# Patient Record
Sex: FEMALE | Race: WHITE | Employment: FULL TIME | URBAN - NONMETROPOLITAN AREA
[De-identification: names, ages, dates, MRNs, and addresses within clinical notes are randomized per-mention and may not be internally consistent; named-entity substitution may affect disease eponyms.]

---

## 2017-03-22 ENCOUNTER — TELEPHONE (OUTPATIENT)
Dept: OTHER | Age: 37
End: 2017-03-22

## 2017-04-20 LAB
CHOLESTEROL, TOTAL: ABNORMAL MG/DL
CHOLESTEROL/HDL RATIO: ABNORMAL
CREATININE, URINE: 220.8
HDLC SERPL-MCNC: ABNORMAL MG/DL (ref 35–70)
LDL CHOLESTEROL CALCULATED: 161 MG/DL (ref 0–160)
MICROALBUMIN/CREAT 24H UR: 77.2 MG/G{CREAT}
MICROALBUMIN/CREAT UR-RTO: 35
T4 FREE: 0.82
TRIGL SERPL-MCNC: ABNORMAL MG/DL
TSH SERPL DL<=0.05 MIU/L-ACNC: 2.62 UIU/ML
VLDLC SERPL CALC-MCNC: ABNORMAL MG/DL

## 2017-04-25 DIAGNOSIS — E05.90 HYPERTHYROIDISM: ICD-10-CM

## 2017-04-25 RX ORDER — METHIMAZOLE 5 MG/1
TABLET ORAL
Qty: 30 TABLET | Refills: 3 | Status: SHIPPED | OUTPATIENT
Start: 2017-04-25 | End: 2017-04-27 | Stop reason: SDUPTHER

## 2017-04-27 ENCOUNTER — OFFICE VISIT (OUTPATIENT)
Dept: ENDOCRINOLOGY | Age: 37
End: 2017-04-27

## 2017-04-27 VITALS
DIASTOLIC BLOOD PRESSURE: 65 MMHG | HEIGHT: 70 IN | SYSTOLIC BLOOD PRESSURE: 118 MMHG | RESPIRATION RATE: 18 BRPM | BODY MASS INDEX: 32.43 KG/M2 | WEIGHT: 226.5 LBS | HEART RATE: 62 BPM

## 2017-04-27 DIAGNOSIS — E10.9 TYPE 1 DIABETES MELLITUS WITHOUT COMPLICATION (HCC): Primary | ICD-10-CM

## 2017-04-27 DIAGNOSIS — R80.9 MICROALBUMINURIA: ICD-10-CM

## 2017-04-27 DIAGNOSIS — E05.90 HYPERTHYROIDISM: ICD-10-CM

## 2017-04-27 DIAGNOSIS — E05.00 GRAVES DISEASE: ICD-10-CM

## 2017-04-27 DIAGNOSIS — E78.5 HYPERLIPIDEMIA, UNSPECIFIED HYPERLIPIDEMIA TYPE: ICD-10-CM

## 2017-04-27 PROCEDURE — 99215 OFFICE O/P EST HI 40 MIN: CPT | Performed by: INTERNAL MEDICINE

## 2017-04-27 RX ORDER — METHIMAZOLE 5 MG/1
TABLET ORAL
Qty: 30 TABLET | Refills: 5 | Status: SHIPPED | OUTPATIENT
Start: 2017-04-27 | End: 2018-04-23 | Stop reason: SDUPTHER

## 2017-09-05 ENCOUNTER — TELEPHONE (OUTPATIENT)
Dept: PHARMACY | Age: 37
End: 2017-09-05

## 2017-09-25 ENCOUNTER — OFFICE VISIT (OUTPATIENT)
Dept: ENDOCRINOLOGY | Age: 37
End: 2017-09-25
Payer: COMMERCIAL

## 2017-09-25 VITALS
HEART RATE: 62 BPM | HEIGHT: 70 IN | BODY MASS INDEX: 33.43 KG/M2 | RESPIRATION RATE: 20 BRPM | SYSTOLIC BLOOD PRESSURE: 114 MMHG | WEIGHT: 233.5 LBS | DIASTOLIC BLOOD PRESSURE: 53 MMHG

## 2017-09-25 DIAGNOSIS — E10.9 TYPE 1 DIABETES MELLITUS WITHOUT COMPLICATION (HCC): Primary | ICD-10-CM

## 2017-09-25 DIAGNOSIS — R80.9 MICROALBUMINURIA: ICD-10-CM

## 2017-09-25 DIAGNOSIS — E05.00 GRAVES DISEASE: ICD-10-CM

## 2017-09-25 DIAGNOSIS — E78.00 PURE HYPERCHOLESTEROLEMIA: ICD-10-CM

## 2017-09-25 PROCEDURE — 99215 OFFICE O/P EST HI 40 MIN: CPT | Performed by: INTERNAL MEDICINE

## 2017-09-25 RX ORDER — NIACIN 500 MG
TABLET ORAL
Qty: 90 TABLET | Refills: 3 | Status: SHIPPED | OUTPATIENT
Start: 2017-09-25 | End: 2019-02-19

## 2017-09-25 RX ORDER — LISINOPRIL 2.5 MG/1
TABLET ORAL
Qty: 30 TABLET | Refills: 3 | Status: SHIPPED | OUTPATIENT
Start: 2017-09-25 | End: 2018-06-19 | Stop reason: SDUPTHER

## 2017-09-25 ASSESSMENT — ENCOUNTER SYMPTOMS: BLURRED VISION: 0

## 2018-02-20 ENCOUNTER — OFFICE VISIT (OUTPATIENT)
Dept: ENDOCRINOLOGY | Age: 38
End: 2018-02-20
Payer: COMMERCIAL

## 2018-02-20 VITALS
WEIGHT: 234 LBS | HEART RATE: 69 BPM | BODY MASS INDEX: 33.5 KG/M2 | SYSTOLIC BLOOD PRESSURE: 104 MMHG | HEIGHT: 70 IN | DIASTOLIC BLOOD PRESSURE: 59 MMHG | RESPIRATION RATE: 14 BRPM

## 2018-02-20 DIAGNOSIS — E78.00 PURE HYPERCHOLESTEROLEMIA: ICD-10-CM

## 2018-02-20 DIAGNOSIS — E05.00 GRAVES DISEASE: ICD-10-CM

## 2018-02-20 DIAGNOSIS — R80.9 URINE TEST POSITIVE FOR MICROALBUMINURIA: ICD-10-CM

## 2018-02-20 DIAGNOSIS — I10 BENIGN ESSENTIAL HYPERTENSION: ICD-10-CM

## 2018-02-20 DIAGNOSIS — E10.9 TYPE 1 DIABETES MELLITUS WITHOUT COMPLICATION (HCC): Primary | ICD-10-CM

## 2018-02-20 PROCEDURE — 99215 OFFICE O/P EST HI 40 MIN: CPT | Performed by: INTERNAL MEDICINE

## 2018-02-20 ASSESSMENT — ENCOUNTER SYMPTOMS
WHEEZING: 0
PHOTOPHOBIA: 0
BACK PAIN: 0
COUGH: 0
SORE THROAT: 0
VOMITING: 0
DOUBLE VISION: 0
EYE PAIN: 0
SHORTNESS OF BREATH: 0
ABDOMINAL PAIN: 0
BLURRED VISION: 0
NAUSEA: 0

## 2018-02-20 NOTE — PROGRESS NOTES
urgency. Musculoskeletal: Negative for back pain, joint pain and myalgias. Skin: Negative for itching and rash. Neurological: Negative for dizziness, tingling, tremors, weakness and headaches. Endo/Heme/Allergies: Negative for polydipsia. Psychiatric/Behavioral: Negative for depression. The patient is not nervous/anxious. Objective:   BP (!) 104/59 (Site: Right Arm, Position: Sitting, Cuff Size: Large Adult)   Pulse 69   Resp 14   Ht 5' 10.08\" (1.78 m)   Wt 234 lb (106.1 kg)   BMI 33.50 kg/m²     General:  alert, appears stated age, cooperative and no distress   Oropharynx: normal findings: lips normal without lesions    Eyes:  negative findings: lids and lashes normal, conjunctivae and sclerae normal, corneas clear        Neck: no adenopathy, no carotid bruit, supple, symmetrical, trachea midline and thyroid not enlarged, symmetric, no tenderness/mass/nodules   Thyroid:  no palpable nodule   Lung: clear to auscultation bilaterally   Heart:  regular rate and rhythm, S1, S2 normal, no murmur, click, rub or gallop    Abdomen: soft, non-tender; bowel sounds normal; no masses,  no organomegaly   Extremities: extremities normal, atraumatic, no cyanosis or edema   Pulses: 2+ and symmetric   Skin: warm and dry, no hyperpigmentation, vitiligo, or suspicious lesions   Neuro: normal without focal findings, mental status, speech normal, alert and oriented x3       Patient was asked to remove their socks and shoes and a full foot exam was performed. The following was found during the patient's foot exam:  [x]   Normal Exam  Monofilament sensation   [x]   Normal      []   Abnormal   Pulses   [x]   Normal      []   Abnormal          Assessment/Plan:     1. Type 1 diabetes mellitus without complication ([facility] [address]): Overdue for an HbA1c. This was ordered today. Goal HbA1c <7%. Goal fasting and pre-meal BG  mg/dl. Trend noted for hyperglycemia after lunch and dinner.  Will increase ICR at these meals.    PUMP CHANGE  7 AM ICR 1:6.5 g --> 1:6 g       2. Graves disease: Continue methimazole. She is tolerating. We discussed the side effects of MMI which include agranulocytosis (fever, sore throat), elevated LFTs, rash and joint pains. She will call if she has any symptoms. We also discussed the options for definitive management such as surgery or LIRIANO. 3. Pure hypercholesterolemia: Continue niacin  Does not want to start statin. Continue to monitor lipids. 4. Microalbuminuria : due to diabetes mellitus. Continue ACE inhibitor. She was advised to get on birth control while on ACE inhibitor. 5. BP At goal.        Orders Placed This Encounter   Procedures    TSH     Standing Status:   Future     Standing Expiration Date:   2/20/2019    T4, Free     Standing Status:   Future     Standing Expiration Date:   2/20/2019    T3, Free     Standing Status:   Future     Standing Expiration Date:   2/20/2019    Hemoglobin A1C     Standing Status:   Future     Standing Expiration Date:   2/20/2019    Comprehensive Metabolic Panel, Fasting     Standing Status:   Future     Standing Expiration Date:   2/20/2019    Lipid Panel     Standing Status:   Future     Standing Expiration Date:   2/20/2019     Order Specific Question:   Is Patient Fasting?/# of Hours     Answer:   yes/8    Microalbumin / Creatinine Urine Ratio     Standing Status:   Future     Standing Expiration Date:   2/20/2019     DIABETES FOOT EXAM          I spent 50 minutes with this pt, more than 50% of this time was spent reviewing labs and blood sugar logs and counseling and answering patient questions. Return in about 3 months (around 5/20/2018).

## 2018-03-02 ENCOUNTER — TELEPHONE (OUTPATIENT)
Dept: ENDOCRINOLOGY | Age: 38
End: 2018-03-02

## 2018-03-02 NOTE — TELEPHONE ENCOUNTER
Can you let her know the hemoglobin A1c was 7.6%. She still has protein in her urine. Her cholesterol is still high. Would she consider going on a cholesterol lowering drug called a statin?     Thanks

## 2018-04-23 DIAGNOSIS — E05.90 HYPERTHYROIDISM: ICD-10-CM

## 2018-04-23 RX ORDER — METHIMAZOLE 5 MG/1
TABLET ORAL
Qty: 30 TABLET | Refills: 5 | Status: SHIPPED | OUTPATIENT
Start: 2018-04-23 | End: 2018-06-19 | Stop reason: SDUPTHER

## 2018-06-19 DIAGNOSIS — E05.90 HYPERTHYROIDISM: ICD-10-CM

## 2018-06-19 DIAGNOSIS — R80.9 MICROALBUMINURIA: ICD-10-CM

## 2018-06-20 RX ORDER — METHIMAZOLE 5 MG/1
TABLET ORAL
Qty: 30 TABLET | Refills: 3 | Status: SHIPPED | OUTPATIENT
Start: 2018-06-20 | End: 2019-02-19

## 2018-06-20 RX ORDER — LISINOPRIL 2.5 MG/1
TABLET ORAL
Qty: 15 TABLET | Refills: 3 | Status: SHIPPED | OUTPATIENT
Start: 2018-06-20 | End: 2019-02-19 | Stop reason: SDUPTHER

## 2018-10-01 LAB
AVERAGE GLUCOSE: NORMAL
HBA1C MFR BLD: 8.2 %

## 2018-11-01 ENCOUNTER — OFFICE VISIT (OUTPATIENT)
Dept: INTERNAL MEDICINE CLINIC | Age: 38
End: 2018-11-01
Payer: COMMERCIAL

## 2018-11-01 VITALS — BODY MASS INDEX: 33.09 KG/M2 | WEIGHT: 231.13 LBS

## 2018-11-01 DIAGNOSIS — E10.9 TYPE 1 DIABETES MELLITUS WITHOUT COMPLICATION (HCC): ICD-10-CM

## 2018-11-01 PROCEDURE — G0108 DIAB MANAGE TRN  PER INDIV: HCPCS | Performed by: INTERNAL MEDICINE

## 2018-12-19 ENCOUNTER — TELEPHONE (OUTPATIENT)
Dept: INTERNAL MEDICINE CLINIC | Age: 38
End: 2018-12-19

## 2019-02-14 ENCOUNTER — OFFICE VISIT (OUTPATIENT)
Dept: INTERNAL MEDICINE CLINIC | Age: 39
End: 2019-02-14
Payer: COMMERCIAL

## 2019-02-14 ENCOUNTER — HOSPITAL ENCOUNTER (OUTPATIENT)
Age: 39
Discharge: HOME | End: 2019-02-14
Payer: COMMERCIAL

## 2019-02-14 DIAGNOSIS — E10.9 TYPE 1 DIABETES MELLITUS WITHOUT COMPLICATION (HCC): Primary | ICD-10-CM

## 2019-02-14 LAB
ALBUMIN SERPL-MCNC: 4.5 G/DL (ref 3.5–5.1)
ALP BLD-CCNC: 56 U/L (ref 38–126)
ALT SERPL-CCNC: 14 U/L (ref 11–66)
ANION GAP SERPL CALCULATED.3IONS-SCNC: 12 MEQ/L (ref 8–16)
AST SERPL-CCNC: 13 U/L (ref 5–40)
BILIRUB SERPL-MCNC: 0.9 MG/DL (ref 0.3–1.2)
BUN BLDV-MCNC: 14 MG/DL (ref 7–22)
CALCIUM SERPL-MCNC: 9.2 MG/DL (ref 8.5–10.5)
CHLORIDE BLD-SCNC: 102 MEQ/L (ref 98–111)
CHOLESTEROL, TOTAL: 233 MG/DL (ref 100–199)
CO2: 26 MEQ/L (ref 23–33)
CREAT SERPL-MCNC: 0.7 MG/DL (ref 0.4–1.2)
CREATININE, URINE: 122.1 MG/DL
GFR SERPL CREATININE-BSD FRML MDRD: > 90 ML/MIN/1.73M2
GLUCOSE BLD-MCNC: 125 MG/DL (ref 70–108)
HBA1C MFR BLD: 6.8 % (ref 4.3–5.7)
HDLC SERPL-MCNC: 47 MG/DL
LDL CHOLESTEROL CALCULATED: 173 MG/DL
MICROALBUMIN UR-MCNC: 2.63 MG/DL
MICROALBUMIN/CREAT UR-RTO: 22 MG/G (ref 0–30)
POTASSIUM SERPL-SCNC: 4.4 MEQ/L (ref 3.5–5.2)
SODIUM BLD-SCNC: 140 MEQ/L (ref 135–145)
T3 FREE: 2.64 PG/ML (ref 2.02–4.43)
T4 FREE: 1.18 NG/DL (ref 0.93–1.76)
TOTAL PROTEIN: 7.3 G/DL (ref 6.1–8)
TRIGL SERPL-MCNC: 66 MG/DL (ref 0–199)
TSH SERPL DL<=0.05 MIU/L-ACNC: 2.05 UIU/ML (ref 0.4–4.2)

## 2019-02-14 PROCEDURE — 36415 COLL VENOUS BLD VENIPUNCTURE: CPT

## 2019-02-14 PROCEDURE — 80061 LIPID PANEL: CPT

## 2019-02-14 PROCEDURE — 84443 ASSAY THYROID STIM HORMONE: CPT

## 2019-02-14 PROCEDURE — 82043 UR ALBUMIN QUANTITATIVE: CPT

## 2019-02-14 PROCEDURE — 83036 HEMOGLOBIN GLYCOSYLATED A1C: CPT | Performed by: INTERNAL MEDICINE

## 2019-02-14 PROCEDURE — G0108 DIAB MANAGE TRN  PER INDIV: HCPCS | Performed by: INTERNAL MEDICINE

## 2019-02-14 PROCEDURE — 80053 COMPREHEN METABOLIC PANEL: CPT

## 2019-02-14 PROCEDURE — 84439 ASSAY OF FREE THYROXINE: CPT

## 2019-02-14 PROCEDURE — 84481 FREE ASSAY (FT-3): CPT

## 2019-02-19 ENCOUNTER — OFFICE VISIT (OUTPATIENT)
Dept: INTERNAL MEDICINE CLINIC | Age: 39
End: 2019-02-19
Payer: COMMERCIAL

## 2019-02-19 VITALS
BODY MASS INDEX: 33.64 KG/M2 | WEIGHT: 235 LBS | HEART RATE: 68 BPM | HEIGHT: 70 IN | DIASTOLIC BLOOD PRESSURE: 84 MMHG | SYSTOLIC BLOOD PRESSURE: 124 MMHG

## 2019-02-19 DIAGNOSIS — E05.00 GRAVES DISEASE: ICD-10-CM

## 2019-02-19 DIAGNOSIS — E10.9 TYPE 1 DIABETES MELLITUS WITHOUT COMPLICATION (HCC): Primary | ICD-10-CM

## 2019-02-19 DIAGNOSIS — E01.0 THYROMEGALY: ICD-10-CM

## 2019-02-19 PROCEDURE — 99214 OFFICE O/P EST MOD 30 MIN: CPT | Performed by: INTERNAL MEDICINE

## 2019-02-19 RX ORDER — LISINOPRIL 2.5 MG/1
TABLET ORAL
Qty: 15 TABLET | Refills: 5 | Status: SHIPPED | OUTPATIENT
Start: 2019-02-19 | End: 2019-05-14 | Stop reason: SDUPTHER

## 2019-02-19 ASSESSMENT — PATIENT HEALTH QUESTIONNAIRE - PHQ9
2. FEELING DOWN, DEPRESSED OR HOPELESS: 0
SUM OF ALL RESPONSES TO PHQ QUESTIONS 1-9: 0
SUM OF ALL RESPONSES TO PHQ QUESTIONS 1-9: 0
1. LITTLE INTEREST OR PLEASURE IN DOING THINGS: 0
SUM OF ALL RESPONSES TO PHQ9 QUESTIONS 1 & 2: 0

## 2019-02-21 ENCOUNTER — HOSPITAL ENCOUNTER (OUTPATIENT)
Dept: ULTRASOUND IMAGING | Age: 39
Discharge: HOME | End: 2019-02-21
Payer: COMMERCIAL

## 2019-02-21 DIAGNOSIS — E01.0 THYROMEGALY: ICD-10-CM

## 2019-02-21 DIAGNOSIS — E05.00 GRAVES DISEASE: ICD-10-CM

## 2019-02-21 PROCEDURE — 76536 US EXAM OF HEAD AND NECK: CPT

## 2019-02-25 ENCOUNTER — TELEPHONE (OUTPATIENT)
Dept: INTERNAL MEDICINE CLINIC | Age: 39
End: 2019-02-25

## 2019-03-26 ENCOUNTER — TELEPHONE (OUTPATIENT)
Dept: INTERNAL MEDICINE CLINIC | Age: 39
End: 2019-03-26

## 2019-05-14 ENCOUNTER — OFFICE VISIT (OUTPATIENT)
Dept: INTERNAL MEDICINE CLINIC | Age: 39
End: 2019-05-14
Payer: COMMERCIAL

## 2019-05-14 VITALS
HEART RATE: 66 BPM | DIASTOLIC BLOOD PRESSURE: 67 MMHG | BODY MASS INDEX: 34.34 KG/M2 | WEIGHT: 239.31 LBS | SYSTOLIC BLOOD PRESSURE: 123 MMHG

## 2019-05-14 VITALS
DIASTOLIC BLOOD PRESSURE: 74 MMHG | WEIGHT: 240 LBS | HEIGHT: 70 IN | SYSTOLIC BLOOD PRESSURE: 108 MMHG | BODY MASS INDEX: 34.36 KG/M2 | HEART RATE: 66 BPM

## 2019-05-14 DIAGNOSIS — E10.9 TYPE 1 DIABETES MELLITUS WITHOUT COMPLICATION (HCC): Primary | ICD-10-CM

## 2019-05-14 DIAGNOSIS — E05.00 GRAVES DISEASE: ICD-10-CM

## 2019-05-14 DIAGNOSIS — Z12.4 CERVICAL CANCER SCREENING: ICD-10-CM

## 2019-05-14 LAB — HBA1C MFR BLD: 6.6 % (ref 4.3–5.7)

## 2019-05-14 PROCEDURE — 99213 OFFICE O/P EST LOW 20 MIN: CPT | Performed by: INTERNAL MEDICINE

## 2019-05-14 PROCEDURE — 83036 HEMOGLOBIN GLYCOSYLATED A1C: CPT | Performed by: INTERNAL MEDICINE

## 2019-05-14 PROCEDURE — G0108 DIAB MANAGE TRN  PER INDIV: HCPCS | Performed by: INTERNAL MEDICINE

## 2019-05-14 RX ORDER — LISINOPRIL 2.5 MG/1
TABLET ORAL
Qty: 15 TABLET | Refills: 5 | Status: SHIPPED | OUTPATIENT
Start: 2019-05-14 | End: 2019-09-17 | Stop reason: SDUPTHER

## 2019-05-14 NOTE — PROGRESS NOTES
1980    Chief Complaint   Patient presents with    Diabetes    Graves' Disease    Other     thyromegaly       Pt is a 45 y.o. female who presents for endo issues. PCP Dr. Tonia Kumar. Diabetes - Normal kidney function, and normal microalbumin 19 - she is on low dose lisinopril. No current statin - many family member unable to tolerate - will ask mom what she didn't tolerate. ASCVD is 6% but  in DM patient - the ASCVD score will be >7.5 at age 37 if all other variables are constant. Foot exam today, no neuropathy  Autonomic dysfunction - patient denies  Eye exam done - get note from [facility] - no DM retinopathy  On insulin pump, sees Chris Juarez DM educator at DM clinic - HgA1c 6.6 2019 - at goal.    She is actively checking FSBS 4-5x daily. She has a consistent KRISTIN phenomenon that is easily seen on her cgm. Abby to follow data and see if nighttime highs continue. Hyperthyroid - now normal - off Tapazole x one year. Enlarged thyroid on exam.  Check thyroid ultrasound. No nodule seen on normal ultrasound 19. Past Medical History:   Diagnosis Date    Abnormal Pap smear     Diabetes mellitus ([address]     type I    Graves disease     Hyperlipidemia     Infertility, female     klomid       Past Surgical History:   Procedure Laterality Date    ANTERIOR CRUCIATE LIGAMENT REPAIR       SECTION      [facility]      WISDOM TOOTH EXTRACTION         Current Outpatient Medications   Medication Sig Dispense Refill    lisinopril (ZESTRIL) 2.5 MG tablet 1/2 tablet daily 15 tablet 5    insulin lispro (HUMALOG) 100 UNIT/ML injection vial Use for continuous infusion in insulin pump. Max dose of 85 units daily 3 vial 5    Cholecalciferol (VITAMIN D3) 5000 units CAPS Take 1 capsule by mouth daily       No current facility-administered medications for this visit.         Allergies   Allergen Reactions    No Known Drug Allergy        Social History Plan:      Diagnosis Orders   1. Type 1 diabetes mellitus without complication (HCC)  lisinopril (ZESTRIL) 2.5 MG tablet    insulin lispro (HUMALOG) 100 UNIT/ML injection vial   2. Graves disease     3. Cervical cancer screening  Sapphire Manley MD, Obstetrics & Gynecology, [facility]     DM - at goal.  Reminded her to get eye exam yearly. Foot exam done. Abby to monitor downloads of CGM and suggest pump changes. On lisinopril and monitor BP. Graves disease/thyromegaly - checked ultrasound, normal TSH, FT4 and FT3 2/2019- normal.    LDL elevated but ASCVD score <7.5 and patient doesn't want to start statin. Monitor for now. Follow up visit in 4 months - same day as DM clinic. Dorothy Nguyen MD    . [facility]  [address] [place] [address]  [address]  [facility]  Dept: [phone]  Dept Fax: [phone] : [phone]

## 2019-05-14 NOTE — PROGRESS NOTES
[facility]  [address] [facility], [address]  [phone] (phone)  [phone] (fax)    Patient ID: Zaira Palm 1980  Referring Provider: Dr. Sargent No     Patient's name and  were verified. Subjective:    She presents for Her follow-up diabetic visit. She has type 1 diabetes mellitus. Home regimen includes: insulin She is compliant most of the time. Assessment:     Lab Results   Component Value Date    LABA1C 6.6 2019    LABA1C 8.2 10/01/2018    BUN 14 2019    CREATININE 0.7 2019     Vitals:    19 1125   BP: 123/67   Pulse: 66   Weight: 239 lb 5 oz (108.6 kg)     Wt Readings from Last 3 Encounters:   19 239 lb 5 oz (108.6 kg)   19 235 lb (106.6 kg)   18 231 lb 2 oz (104.8 kg)     Ht Readings from Last 3 Encounters:   19 5' 10\" (1.778 m)   18 5' 10.08\" (1.78 m)   17 5' 10.08\" (1.78 m)       Glucose at 2 hrs PPD today resulted at 143mg/dl  Current monitoring regimen: home blood tests - 3 times daily  Home blood sugar trends:some turbulence after meals. Any episodes of hypoglycemia? rare  Previous visit with dietician: yes - counts carbohydrates  Current diet: on average, 3 meals per day  Current exercise: walking  Eye exam current (within one year): yes  Any history of foot problems? no  Last foot exam: deferred  Immunizations up to date: na  Taking ASA:  No - If not why? Appropriate for use of MyChart Glucose Grid:  Yes    Focus:     Marivel Baldwin continues to be very successful in her pump self management using automode with cgm on her her 670G medtronic insulin pump. She states her current pump settings are meeting the majority of her needs for good BS control without having lows. Teaching done rt clarification of workings of auto mode, stresses, barriers to behavior goals, and safety. DSME PLAN:   Discussed general issues about diabetes pathophysiology and management.   Counseling at today's visit: discussed the need for weight loss, focused on the need to adhere to the prescribed ADA diet, reminded to check sugars regularly and to bring readings in at the time of the next visit, discussed sick day management and discussed management of hypoglycemic episodes. Meter download, medications, PMH and nursing assessment reviewed. Sharyn Anaya states She is willing to participate in this plan of care and verbalized understanding of all instructions provided. Teach back used to verify comprehension. Total time involved in direct patient education: 60 minutes. 1.  Reggie Amador is the certified diabetes educator. 2.  POC A1c today is 6.6%.   3. Continue DSME plan for pump self management

## 2019-05-14 NOTE — PATIENT INSTRUCTIONS
1. Kacy Olvera is the certified diabetes educator. 2.  A1c today is 6.6%.   3. Continue DSME plan for pump self management

## 2019-07-11 ENCOUNTER — TELEPHONE (OUTPATIENT)
Dept: INTERNAL MEDICINE CLINIC | Age: 39
End: 2019-07-11

## 2019-09-12 DIAGNOSIS — E05.00 GRAVES DISEASE: ICD-10-CM

## 2019-09-12 DIAGNOSIS — E10.9 TYPE 1 DIABETES MELLITUS WITHOUT COMPLICATION (HCC): Primary | ICD-10-CM

## 2019-09-17 ENCOUNTER — OFFICE VISIT (OUTPATIENT)
Dept: INTERNAL MEDICINE CLINIC | Age: 39
End: 2019-09-17
Payer: COMMERCIAL

## 2019-09-17 ENCOUNTER — TELEPHONE (OUTPATIENT)
Dept: INTERNAL MEDICINE CLINIC | Age: 39
End: 2019-09-17

## 2019-09-17 VITALS
HEIGHT: 70 IN | HEART RATE: 68 BPM | DIASTOLIC BLOOD PRESSURE: 62 MMHG | WEIGHT: 238 LBS | BODY MASS INDEX: 34.07 KG/M2 | SYSTOLIC BLOOD PRESSURE: 98 MMHG

## 2019-09-17 DIAGNOSIS — E78.00 PURE HYPERCHOLESTEROLEMIA: Primary | ICD-10-CM

## 2019-09-17 DIAGNOSIS — E10.9 TYPE 1 DIABETES MELLITUS WITHOUT COMPLICATION (HCC): ICD-10-CM

## 2019-09-17 DIAGNOSIS — E05.00 GRAVES DISEASE: ICD-10-CM

## 2019-09-17 LAB
ANION GAP SERPL CALCULATED.3IONS-SCNC: 8 MMOL/L (ref 4–12)
BUN BLDV-MCNC: 17 MG/DL (ref 5–23)
CALCIUM SERPL-MCNC: 9 MG/DL (ref 8.5–10.5)
CHLORIDE BLD-SCNC: 105 MMOL/L (ref 98–109)
CO2: 27 MMOL/L (ref 22–32)
CREAT SERPL-MCNC: 0.76 MG/DL (ref 0.4–1)
EGFR AFRICAN AMERICAN: >60 ML/MIN/1.73SQ.M
EGFR IF NONAFRICAN AMERICAN: >60 ML/MIN/1.73SQ.M
GLUCOSE: 141 MG/DL (ref 65–99)
HBA1C MFR BLD: 6.7 % (ref 4.3–5.7)
LDL CHOLESTEROL DIRECT: 193 MG/DL
POTASSIUM SERPL-SCNC: 5 MMOL/L (ref 3.5–5)
SODIUM BLD-SCNC: 140 MMOL/L (ref 134–146)
TSH SERPL DL<=0.05 MIU/L-ACNC: 2.3 UIU/ML (ref 0.49–4.67)

## 2019-09-17 PROCEDURE — 83036 HEMOGLOBIN GLYCOSYLATED A1C: CPT | Performed by: INTERNAL MEDICINE

## 2019-09-17 PROCEDURE — 99213 OFFICE O/P EST LOW 20 MIN: CPT | Performed by: INTERNAL MEDICINE

## 2019-09-17 RX ORDER — ATORVASTATIN CALCIUM 10 MG/1
10 TABLET, FILM COATED ORAL DAILY
Qty: 30 TABLET | Refills: 5 | Status: SHIPPED | OUTPATIENT
Start: 2019-09-17 | End: 2019-10-09 | Stop reason: SINTOL

## 2019-09-17 RX ORDER — LISINOPRIL 2.5 MG/1
TABLET ORAL
Qty: 15 TABLET | Refills: 5 | Status: SHIPPED | OUTPATIENT
Start: 2019-09-17 | End: 2019-12-17 | Stop reason: SDUPTHER

## 2019-09-17 NOTE — TELEPHONE ENCOUNTER
Reviewed insulin pump download following Dr. Paty Yousif visit. Noting struggle with bkfst and noon clearance. Also struggling with getting elevated blood sugars to come down. China Hopper states she adds to her carb count to better cover elevated readings. Reviewed with Dr. Shantell Loaiza and authorization given to:  -increase 7am carb ratio to 5.5 grams  -increase actvie insulin time to 2:30 hours  Also discussed bolusing 10 minutes before eating unless BS is close to 100. Changes in these settings made before China Hopper left the office.

## 2019-09-17 NOTE — PROGRESS NOTES
1980    Chief Complaint   Patient presents with    Diabetes     6 months     Graves' Disease       Pt is a 45 y.o. female who presents for endo issues- 6 month follow up. PCP Dr. Jennifer Chawla. Diabetes - Normal kidney function 2019, and normal microalbumin 2019 - she is on low dose lisinopril. No current statin - many family member unable to tolerate - will ask mom what she didn't tolerate. ASCVD is 6% but  in DM patient.  2019- Now agreeable to statin (mother had another MI). No foot lesions, no neuropathy  Autonomic dysfunction - patient denies  Eye exam done 19 -[place] 45 - no DM retinopathy. On insulin pump, sees Kimberly Tello DM educator at DM clinic - HgA1c 6.6 2019 - at goal.    She is actively checking FSBS 4-5x daily. She has a consistent KRISTIN phenomenon that is easily seen on her cgm. Kena Mc to follow data and see if nighttime highs continue. She has a CGM to warn her when <70 - she typically feels it prior to low. She set her alarm at 200 instead of 250 to correct it sooner - will drink more water. HgA1c 6.7 2019 - controlled, type I DM but do worry about lows as she aggressively treats hyperglycemia. Will have Kena Mc look at insulin pump query today and advise on any changes to pump. Graves disease/Hyperthyroid - now normal - off Tapazole x 1.5 year. Enlarged thyroid on exam.  Checked thyroid ultrasound. No nodule seen on normal ultrasound 19. TSH normal 2019.     Past Medical History:   Diagnosis Date    Abnormal Pap smear     Diabetes mellitus ([address]     type I    Graves disease     Hyperlipidemia     Infertility, female     klomid       Past Surgical History:   Procedure Laterality Date    ANTERIOR CRUCIATE LIGAMENT REPAIR       SECTION  104005    LEE      WISDOM TOOTH EXTRACTION         Current Outpatient Medications   Medication Sig Dispense Refill    lisinopril (ZESTRIL) 2.5 MG tablet 1/2 tablet daily 15 activity: Not on file   Other Topics Concern    Not on file   Social History Narrative    ** Merged History Encounter **            Family History   Problem Relation Age of Onset    Heart Disease Mother 46        5 bypass CABBG    High Blood Pressure Mother         FMD    High Cholesterol Mother     Other Mother         Heart stent   [place] Ban Migraines Mother     Thyroid Disease Father         Hypothyroidism    Other Father         AAA    Stroke Maternal Grandmother 66    Diabetes Maternal Grandmother     Cancer Maternal Grandmother 79        Prostate    Hearing Loss Maternal Grandmother     Cancer Maternal Grandfather 80        Prostate Cancer    Miscarriages / Stillbirths Sister        Review of Systems - General ROS: negative for - chills or fever  Psychological ROS: negative for - anxiety and depression  Hematological and Lymphatic ROS: No history of blood clots or bleeding disorder. Respiratory ROS: no cough, shortness of breath, or wheezing  Cardiovascular ROS: no chest pain or dyspnea on exertion, tolerates a flight of stairs, vacuuming  Gastrointestinal ROS: no abdominal pain, change in bowel habits, or black or bloody stools  Genito-Urinary ROS: no dysuria, trouble voiding, or hematuria  Musculoskeletal ROS: negative for - muscle pain or muscular weakness  Neurological ROS: negative for - headaches, numbness/tingling, seizures or weakness  Dermatological ROS: negative for - rash or skin lesion changes    Blood pressure 98/62, pulse 68, height 5' 10\" (1.778 m), weight 238 lb (108 kg), not currently breastfeeding.     Physical Examination: General appearance -alert, well appearing, and in no distress  Mental status - alert, oriented to person, place, and time  Neck - supple, no significant adenopathy, mild thyromegaly  Chest - clear to auscultation, no wheezes, rales or rhonchi, symmetric air entry  Heart - normal rate, regular rhythm, normal S1, S2, no murmurs, rubs, clicks or gallops  Abdomen -soft, nontender, nondistended  Neurological - alert, oriented, normal speech and gait. Extremities - peripheral pulses normal, no pedal edema, no clubbing or cyanosis  Skin - warm and dry    Foot exam  2/19/19: No foot lesions, normal sensation with monofilament testing bilaterally. +2 DP and PT pulses bilaterally. Diagnostic data:   I have reviewed recent diagnostic testing including labs 9/2019 with patient. Assessment and Plan:      Diagnosis Orders   1. Pure hypercholesterolemia  atorvastatin (LIPITOR) 10 MG tablet    ALT    LDL Cholesterol, Direct   2. Type 1 diabetes mellitus without complication (HCC)  POCT glycosylated hemoglobin (Hb A1C)    20094 - Collection Capillary Blood Specimen    lisinopril (ZESTRIL) 2.5 MG tablet    insulin lispro (HUMALOG) 100 UNIT/ML injection vial    atorvastatin (LIPITOR) 10 MG tablet     Hyperlipidemia - LDL elevated 194 9/2019 on no medication - educated that we would like to see DM on statin and LDL <100. Will start Lipitor and repeat labs in 3 months. DM - HgA1c 6.7 9/2019, at goal.  Reminded her to get eye exam yearly. Foot exam done 2/2019. Jenney Sever to monitor downloads of CGM and suggest pump changes. On lisinopril and monitor BP. Start statin. Graves disease - checked ultrasound - normal, normal TSH 9/2019. Follow up visit in 3 months. Labs due in 3 months. Faustino Howard MD    . Yelena Mack  INTERNAL MEDICINE  [address]  [address]  [address]  Dept: [phone]  Dept Fax: [phone] : [phone]

## 2019-09-18 ENCOUNTER — NURSE ONLY (OUTPATIENT)
Dept: LAB | Age: 39
End: 2019-09-18

## 2019-10-08 ENCOUNTER — TELEPHONE (OUTPATIENT)
Dept: INTERNAL MEDICINE CLINIC | Age: 39
End: 2019-10-08

## 2019-10-08 DIAGNOSIS — E78.00 PURE HYPERCHOLESTEROLEMIA: Primary | ICD-10-CM

## 2019-10-09 RX ORDER — ROSUVASTATIN CALCIUM 10 MG/1
10 TABLET, COATED ORAL NIGHTLY
Qty: 30 TABLET | Refills: 3 | Status: SHIPPED | OUTPATIENT
Start: 2019-10-09 | End: 2019-12-17

## 2019-12-17 ENCOUNTER — OFFICE VISIT (OUTPATIENT)
Dept: INTERNAL MEDICINE CLINIC | Age: 39
End: 2019-12-17
Payer: COMMERCIAL

## 2019-12-17 VITALS
DIASTOLIC BLOOD PRESSURE: 84 MMHG | BODY MASS INDEX: 34.41 KG/M2 | SYSTOLIC BLOOD PRESSURE: 110 MMHG | WEIGHT: 240.4 LBS | HEART RATE: 64 BPM | HEIGHT: 70 IN

## 2019-12-17 VITALS
HEIGHT: 70 IN | BODY MASS INDEX: 34.41 KG/M2 | DIASTOLIC BLOOD PRESSURE: 84 MMHG | HEART RATE: 64 BPM | SYSTOLIC BLOOD PRESSURE: 110 MMHG | WEIGHT: 240.4 LBS

## 2019-12-17 DIAGNOSIS — E10.9 TYPE 1 DIABETES MELLITUS WITHOUT COMPLICATION (HCC): Primary | ICD-10-CM

## 2019-12-17 DIAGNOSIS — E10.9 TYPE 1 DIABETES MELLITUS WITHOUT COMPLICATION (HCC): ICD-10-CM

## 2019-12-17 DIAGNOSIS — E78.00 PURE HYPERCHOLESTEROLEMIA: ICD-10-CM

## 2019-12-17 DIAGNOSIS — E05.00 GRAVES DISEASE: ICD-10-CM

## 2019-12-17 LAB — HBA1C MFR BLD: 6.7 % (ref 4.3–5.7)

## 2019-12-17 PROCEDURE — 99213 OFFICE O/P EST LOW 20 MIN: CPT | Performed by: INTERNAL MEDICINE

## 2019-12-17 PROCEDURE — G0108 DIAB MANAGE TRN  PER INDIV: HCPCS | Performed by: INTERNAL MEDICINE

## 2019-12-17 RX ORDER — CHLORAL HYDRATE 500 MG
1000 CAPSULE ORAL DAILY
COMMUNITY
End: 2021-12-13

## 2019-12-17 RX ORDER — PRAVASTATIN SODIUM 20 MG
20 TABLET ORAL DAILY
Qty: 30 TABLET | Refills: 5 | Status: SHIPPED | OUTPATIENT
Start: 2019-12-17 | End: 2020-11-16 | Stop reason: SDUPTHER

## 2019-12-17 RX ORDER — LISINOPRIL 2.5 MG/1
TABLET ORAL
Qty: 15 TABLET | Refills: 5 | Status: SHIPPED | OUTPATIENT
Start: 2019-12-17 | End: 2020-11-16 | Stop reason: SDUPTHER

## 2020-03-12 ENCOUNTER — TELEPHONE (OUTPATIENT)
Dept: INTERNAL MEDICINE CLINIC | Age: 40
End: 2020-03-12

## 2020-03-16 LAB
CHOLESTEROL, TOTAL: 171 MG/DL
CHOLESTEROL/HDL RATIO: NORMAL
CREATININE, URINE: 242.6
HDLC SERPL-MCNC: 39 MG/DL (ref 35–70)
LDL CHOLESTEROL CALCULATED: 114 MG/DL (ref 0–160)
MICROALBUMIN/CREAT 24H UR: 7.8 MG/G{CREAT}
MICROALBUMIN/CREAT UR-RTO: 32
TRIGL SERPL-MCNC: 91 MG/DL
VLDLC SERPL CALC-MCNC: 18 MG/DL

## 2020-11-16 ENCOUNTER — OFFICE VISIT (OUTPATIENT)
Dept: INTERNAL MEDICINE CLINIC | Age: 40
End: 2020-11-16
Payer: COMMERCIAL

## 2020-11-16 ENCOUNTER — NURSE ONLY (OUTPATIENT)
Dept: LAB | Age: 40
End: 2020-11-16

## 2020-11-16 VITALS
HEIGHT: 70 IN | HEART RATE: 76 BPM | DIASTOLIC BLOOD PRESSURE: 72 MMHG | TEMPERATURE: 97.7 F | BODY MASS INDEX: 31.5 KG/M2 | SYSTOLIC BLOOD PRESSURE: 130 MMHG | WEIGHT: 220 LBS

## 2020-11-16 VITALS
SYSTOLIC BLOOD PRESSURE: 130 MMHG | DIASTOLIC BLOOD PRESSURE: 72 MMHG | BODY MASS INDEX: 31.52 KG/M2 | WEIGHT: 220.2 LBS | HEIGHT: 70 IN | TEMPERATURE: 97.1 F | HEART RATE: 76 BPM

## 2020-11-16 LAB
ALT SERPL-CCNC: 18 U/L (ref 11–66)
ANION GAP SERPL CALCULATED.3IONS-SCNC: 12 MEQ/L (ref 8–16)
BUN BLDV-MCNC: 18 MG/DL (ref 7–22)
CALCIUM SERPL-MCNC: 9.9 MG/DL (ref 8.5–10.5)
CHLORIDE BLD-SCNC: 98 MEQ/L (ref 98–111)
CO2: 25 MEQ/L (ref 23–33)
CREAT SERPL-MCNC: 0.5 MG/DL (ref 0.4–1.2)
CREATININE, URINE: 62.9 MG/DL
GFR SERPL CREATININE-BSD FRML MDRD: > 90 ML/MIN/1.73M2
GLUCOSE BLD-MCNC: 165 MG/DL (ref 70–108)
HBA1C MFR BLD: 6.6 % (ref 4.3–5.7)
LDL CHOLESTEROL DIRECT: 153.12 MG/DL
MICROALBUMIN UR-MCNC: 4.44 MG/DL
MICROALBUMIN/CREAT UR-RTO: 71 MG/G (ref 0–30)
POTASSIUM SERPL-SCNC: 4.4 MEQ/L (ref 3.5–5.2)
SODIUM BLD-SCNC: 135 MEQ/L (ref 135–145)
T4 FREE: 2 NG/DL (ref 0.93–1.76)
TSH SERPL DL<=0.05 MIU/L-ACNC: < 0.005 UIU/ML (ref 0.4–4.2)

## 2020-11-16 PROCEDURE — G0108 DIAB MANAGE TRN  PER INDIV: HCPCS | Performed by: REGISTERED NURSE

## 2020-11-16 PROCEDURE — 83036 HEMOGLOBIN GLYCOSYLATED A1C: CPT | Performed by: INTERNAL MEDICINE

## 2020-11-16 PROCEDURE — 99214 OFFICE O/P EST MOD 30 MIN: CPT | Performed by: INTERNAL MEDICINE

## 2020-11-16 RX ORDER — PRAVASTATIN SODIUM 20 MG
20 TABLET ORAL DAILY
Qty: 30 TABLET | Refills: 5 | Status: SHIPPED | OUTPATIENT
Start: 2020-11-16 | End: 2021-05-20 | Stop reason: SDUPTHER

## 2020-11-16 RX ORDER — LISINOPRIL 2.5 MG/1
TABLET ORAL
Qty: 15 TABLET | Refills: 5 | Status: SHIPPED | OUTPATIENT
Start: 2020-11-16 | End: 2021-05-20 | Stop reason: SDUPTHER

## 2020-11-16 NOTE — PATIENT INSTRUCTIONS
Try to bolus 5-10 minutes before eating  Carb ratios   7am 5.5 grams-increase 5 grams                       3pm 4.5 grams -increase 4 grams                       9pm 5.0 grams -increase 4.5 grams  When skipping breakfast and taking morning shower = 5 grams carb  Goal for blood sugar 2 hours after eating = under 180/200

## 2020-11-16 NOTE — PROGRESS NOTES
[facility]  [address], [facility], [address]  [phone] (phone)  [phone] (fax)    Patient ID: Jorge A Fernandez 1980  Referring Provider: Dr. Ambreen Friend     Patient's name and  were verified. Subjective:    She presents for Her follow-up diabetic visit. She has type 1 diabetes mellitus. Home regimen includes: insulin She is compliant most of the time. Assessment:     Lab Results   Component Value Date    LABA1C 6.6 2020    LABA1C 8.2 10/01/2018    BUN 17 2019    CREATININE 0.76 2019     Vitals:    20 1738   BP: 130/72   Site: Right Upper Arm   Position: Sitting   Pulse: 76   Temp: 97.7 °F (36.5 °C)   Weight: 220 lb (99.8 kg)   Height: 5' 10\" (1.778 m)     Wt Readings from Last 3 Encounters:   20 220 lb (99.8 kg)   20 220 lb 3.2 oz (99.9 kg)   19 240 lb 6.4 oz (109 kg)     Ht Readings from Last 3 Encounters:   20 5' 10\" (1.778 m)   20 5' 10\" (1.778 m)   19 5' 10\" (1.778 m)       Current monitoring regimen: home blood tests - 4+ times daily/ Medtronic CGM  Home blood sugar trends: see Medtronic download--spikes after most meals of over 200  Any episodes of hypoglycemia? yes - 1-2 per week; timing random  Previous visit with dietician: remote  Current diet: B- protein shake or egg/ toast                L- Croatian onion soup/ pesto chicken/ fries                D- protein/ starch/ veggie  Current exercise: ADL's-very active running a restaurant. Eye exam current (within one year): no 2019 [facility]  Any history of foot problems? no  Last foot exam: Dr. Irasema Davis 20  Immunizations up to date: no; declines  Taking ASA:  No - If not why? Not indicated  Appropriate for use of MyChart Glucose Grid:  Yes    Focus:     Diabetes education follow up. A1C today 6.6%. Weight is down 20# from last visit.  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 Sometimes her diabetes takes second base. We did discuss evening calming yoga or stress relief breathing and her priority to continue self care in order to do everything else she needs to do. Overall, Sienna Romero is doing well. Much encouragement given. Follow up 6 months. Encouraged to download insulin pump if goals are not being achieved. DSME PLAN:   Discussed general issues about diabetes pathophysiology and management. Counseling at today's visit: BG goals-2 hr vs 4hr; carbs; exercise; relaxation; automode. Try to bolus 5-10 minutes before eating  Carb ratios   7am 5.5 grams-increase 5 grams                       3pm 4.5 grams -increase 4 grams                       9pm 5.0 grams -increase 4.5 grams  When skipping breakfast and taking morning shower = 5 grams carb  Goal for blood sugar 2 hours after eating = under 180/200    Meter download, medications, PMH and nursing assessment reviewed. Mohan Berg states She is willing to participate in this plan of care and verbalized understanding of all instructions provided. Teach back used to verify comprehension. Total time involved in direct patient education: 60 minutes.

## 2020-11-16 NOTE — PROGRESS NOTES
1980    Chief Complaint   Patient presents with    Diabetes     3 months r/s    Hyperlipidemia    Graves' Disease       Pt is a 36 y.o. female who presents for endo issues- 3 month follow up but I haven't seen in almost a year. PCP Dr. Moris Blackmon. 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 Diabetes -   Normal kidney function 3/2020, and slightly elevated microalbumin/Cr 3/2020 at 32 - she was to be on lisinopril. She doesn't like to take medication and stopped it for awhile, recently restarted 9/2020.  9/2019 (want <100 in DM patient)- was agreeable to statin (mother had another MI). See discussion below.  3/2020 on pravastatin. But stopped it in the spring due to shoulder pain - doubt this caused shoulder pain - she had abnormal MRI to account for shoulder pain. Strongly encouraged her to restart pravastatin. No foot lesions, no neuropathy  Autonomic dysfunction - patient denies  Eye exam done 4/18/19 -Ricardo 45 - no DM retinopathy. Reminded her to repeat yearly. On insulin pump, sees Meredith Patel DM educator at DM clinic    She is actively checking FSBS 4-5x daily. She has been extending her CGM sensor x 3 weeks due to pain with insertion of needle. Some numbers not reliable on sensor readings. She has a consistent KRISTIN phenomenon that is easily seen on her cgm. Meredith Patel to follow data and see if nighttime highs continue. She has a CGM to warn her when <70 - she typically feels it prior to low. She set her alarm at 200 instead of 250 to correct it sooner - will drink more water. Controlled, type I DM but do worry about lows as she aggressively treats hyperglycemia. Will have Meredith Patel look at insulin pump query today and advise on any changes to pump. Hg 6.7 12/2019, HgA1c 6.6 11/2020 - she is seeing Meredith Patel this afternoon to discuss CGM, sensitivity to insulin - skyrockets after eating.   She will tell pump that she ate 20 gm carb to get more insulin to cover an hour after eating. Need to look at pump settings. She is down 20# in a year. Graves disease/Hyperthyroid - now normal - off Tapazole x 2.5 year. Enlarged thyroid on exam.  Checked thyroid ultrasound. No nodule seen on normal ultrasound 2/21/19. TSH normal 9/2019. On Thyromin - OTC supplement - [email or web address]. It has iodine and animal thyroid. No palpitations tremors. Still on Thyromin. Repeat TSH now. Hypercholesterolemia - She only did Lipitor a couple days due to constipation and stomach cramps/leg cramp. Then we changed to Crestor. She took for 2 weeks but stopped as she had mental fogginess - paid mortgage twice and forgot kid at school, trouble with names. She was on pravastatin LDL improved to 114 3/2020 - she stopped it since last visit and need to restart. Obesity - BMI 31.57 - continue to work on diet and exercise. Right shoulder pain -saw OIO at [facility] - MRI done bicep inflammation, and arthritis, strained RTC from over use, 7/2020 did PT for shoulder, 2 cortisone injections. Now no longer hurts. She stopped statin in spring due to pain. Now willing to try statin again. Past Medical History:   Diagnosis Date    Abnormal Pap smear     Diabetes mellitus ([facility] [address] 9/12    type I    Graves disease     Hyperlipidemia     Infertility, female     klomid       Current Outpatient Medications   Medication Sig Dispense Refill    lisinopril (ZESTRIL) 2.5 MG tablet 1/2 tablet daily 15 tablet 5    pravastatin (PRAVACHOL) 20 MG tablet Take 1 tablet by mouth daily 30 tablet 5    insulin aspart (NOVOLOG) 100 UNIT/ML injection vial Use for continuous infusion in insulin pump . Max dose 100 units daily .  3 vial 5    Omega-3 1000 MG CAPS Take 1,000 mg by mouth daily      NONFORMULARY Take 1 tablet by mouth daily Thyromin supplement      Cholecalciferol (VITAMIN D3) 5000 units CAPS Take 1 capsule by mouth daily       No current facility-administered medications for this visit. Allergies   Allergen Reactions    Crestor [Rosuvastatin Calcium]      Mental fogginess    Lipitor [Atorvastatin Calcium]      Constipation, stomach and leg cramps       Social History     Socioeconomic History    Marital status:      Spouse name: Not on file    Number of children: Not on file    Years of education: Not on file    Highest education level: Not on file   Occupational History    Occupation: Housewife   Social Needs    Financial resource strain: Not on file    Food insecurity     Worry: Not on file     Inability: Not on file   [employer] needs     Medical: Not on file     Non-medical: Not on file   Tobacco Use    Smoking status: Former Smoker     Packs/day: 0.30     Years: 2.00     Pack years: 0.60     Last attempt to quit: 2002     Years since quittin.9    Smokeless tobacco: Never Used   Substance and Sexual Activity    Alcohol use:  Yes     Alcohol/week: 0.8 standard drinks     Types: 1 Standard drinks or equivalent per week     Comment: Occasionally    Drug use: No    Sexual activity: Yes     Partners: Male   Lifestyle    Physical activity     Days per week: Not on file     Minutes per session: Not on file    Stress: Not on file   Relationships    Social connections     Talks on phone: Not on file     Gets together: Not on file     Attends Jewish service: Not on file     Active member of club or organization: Not on file     Attends meetings of clubs or organizations: Not on file     Relationship status: Not on file    Intimate partner violence     Fear of current or ex partner: Not on file     Emotionally abused: Not on file     Physically abused: Not on file     Forced sexual activity: Not on file   Other Topics Concern    Not on file   Social History Narrative    ** Merged History Encounter **            Review of Systems - General ROS: negative for - chills or fever  Psychological ROS: negative for - anxiety and depression  Hematological and Lymphatic ROS: No history of blood clots or bleeding disorder. Respiratory ROS: no cough, shortness of breath, or wheezing  Cardiovascular ROS: no chest pain or dyspnea on exertion, tolerates a flight of stairs, vacuuming  Gastrointestinal ROS: no abdominal pain, change in bowel habits, or black or bloody stools  Genito-Urinary ROS: no dysuria, trouble voiding, or hematuria  Musculoskeletal ROS: negative for - muscle pain or muscular weakness  Neurological ROS: negative for - headaches, numbness/tingling, seizures or weakness  Dermatological ROS: negative for - rash or skin lesion changes    Blood pressure 130/72, pulse 76, temperature 97.1 °F (36.2 °C), height 5' 10\" (1.778 m), weight 220 lb 3.2 oz (99.9 kg), not currently breastfeeding. Physical Examination: General appearance -alert, well appearing, and in no distress  Mental status - alert, oriented to person, place, and time  Neck - supple, no significant adenopathy, mild thyromegaly  Chest - clear to auscultation, no wheezes, rales or rhonchi, symmetric air entry  Heart - normal rate, regular rhythm, normal S1, S2, no murmurs, rubs, clicks or gallops  Abdomen -soft, nontender, nondistended  Neurological - alert, oriented, normal speech and gait. Extremities - peripheral pulses normal, no pedal edema, no clubbing or cyanosis  Skin - warm and dry    Foot exam 11/16/20: No foot lesions, normal sensation with monofilament testing bilaterally. +2 DP and PT pulses bilaterally. Diagnostic data:   I have reviewed recent diagnostic testing including labs 3/2020 with patient. Assessment and Plan:      Diagnosis Orders   1. Type 1 diabetes mellitus with microalbuminuria (HCC)  lisinopril (ZESTRIL) 2.5 MG tablet   2.  Type 1 diabetes mellitus without complication (HCC)  POCT glycosylated hemoglobin (Hb A1C)    05038 - Collection Capillary Blood Specimen    insulin aspart (NOVOLOG) 100 UNIT/ML injection vial    HM DIABETES FOOT EXAM    Basic Metabolic Panel    Microalbumin / Creatinine Urine Ratio   3. Pure hypercholesterolemia  pravastatin (PRAVACHOL) 20 MG tablet    ALT    LDL Cholesterol, Direct   4. Graves disease  TSH    T4, Free     DM - HgA1c 6.6 11/2020, at goal - pump to be adjusted per Michael Berumen in DM clinic - to see today. Reminded her to get eye exam yearly. Foot exam done today. Get back on lisinopril and monitor BP. Re-start pravastatin. Initially was given DM without complication to order [id number] prior to visit but more accurate dx is with microalbuminuria. Hyperlipidemia - LDL elevated 194 9/2019 on no medication - educated that we would like to see DM on statin and LDL <100. She didn't tolerate Lipitor or Crestor. Started pravastatin and  on it. Restart Pravastatin - not going to increase dose till she that she tolerates it. Graves disease - check TSH. Continue iodine supplement as this has helped in the past keep it normal.    Follow up visit in 6 months. Labs due now. Triston Almaraz MD    . Yelena Mack  INTERNAL MEDICINE  [address]   [address]  [facility]  Dept: [phone]  Dept Fax: [phone] : [phone]

## 2020-11-17 ENCOUNTER — TELEPHONE (OUTPATIENT)
Dept: INTERNAL MEDICINE CLINIC | Age: 40
End: 2020-11-17

## 2020-11-19 NOTE — TELEPHONE ENCOUNTER
Tim Barrios instructed on insulin pump changes as ordered. carb ratios         12am 9 grams        7am 5.5 grams  --increase 5 grams        3pm 4.5 grams --increase 4 grams        9pm 5 grams  -increase 4.5 grams  Tim Barrios voiced understanding of these changes in her pump settings via teach back.

## 2020-11-23 ENCOUNTER — TELEPHONE (OUTPATIENT)
Dept: INTERNAL MEDICINE CLINIC | Age: 40
End: 2020-11-23

## 2020-11-23 NOTE — TELEPHONE ENCOUNTER
----- Message from Winston Kussmaul, MD sent at 11/22/2020  2:09 PM EST -----  Call patient - labs point to hyperthyroidism - need to check all supplements, and drinks that may have minerals in them - is she taking any biotin - this will falsify testing. Stop biotin. Must be off biotin x 2 days prior to retesting. Order TSH, Free T4 and Free T3, and TRAb to be done now or 2 days off biotin. Is she having any symptoms of anxiety, tremors, palpitations, diarrhea, sweating?

## 2021-05-20 ENCOUNTER — OFFICE VISIT (OUTPATIENT)
Dept: INTERNAL MEDICINE CLINIC | Age: 41
End: 2021-05-20
Payer: COMMERCIAL

## 2021-05-20 ENCOUNTER — NURSE ONLY (OUTPATIENT)
Dept: LAB | Age: 41
End: 2021-05-20

## 2021-05-20 VITALS
HEIGHT: 70 IN | HEART RATE: 74 BPM | TEMPERATURE: 97.3 F | DIASTOLIC BLOOD PRESSURE: 68 MMHG | BODY MASS INDEX: 32.78 KG/M2 | SYSTOLIC BLOOD PRESSURE: 124 MMHG | WEIGHT: 229 LBS

## 2021-05-20 DIAGNOSIS — E78.00 PURE HYPERCHOLESTEROLEMIA: ICD-10-CM

## 2021-05-20 DIAGNOSIS — E05.00 GRAVES DISEASE: ICD-10-CM

## 2021-05-20 DIAGNOSIS — E10.29 TYPE 1 DIABETES MELLITUS WITH MICROALBUMINURIA (HCC): ICD-10-CM

## 2021-05-20 DIAGNOSIS — R80.9 TYPE 1 DIABETES MELLITUS WITH MICROALBUMINURIA (HCC): ICD-10-CM

## 2021-05-20 DIAGNOSIS — E10.29 TYPE 1 DIABETES MELLITUS WITH MICROALBUMINURIA (HCC): Primary | ICD-10-CM

## 2021-05-20 DIAGNOSIS — R80.9 TYPE 1 DIABETES MELLITUS WITH MICROALBUMINURIA (HCC): Primary | ICD-10-CM

## 2021-05-20 DIAGNOSIS — E10.9 TYPE 1 DIABETES MELLITUS WITHOUT COMPLICATION (HCC): ICD-10-CM

## 2021-05-20 LAB
ALT SERPL-CCNC: 16 U/L (ref 11–66)
ANION GAP SERPL CALCULATED.3IONS-SCNC: 9 MEQ/L (ref 8–16)
BUN BLDV-MCNC: 11 MG/DL (ref 7–22)
CALCIUM SERPL-MCNC: 9.1 MG/DL (ref 8.5–10.5)
CHLORIDE BLD-SCNC: 104 MEQ/L (ref 98–111)
CHOLESTEROL, TOTAL: 177 MG/DL (ref 100–199)
CO2: 25 MEQ/L (ref 23–33)
CREAT SERPL-MCNC: 0.6 MG/DL (ref 0.4–1.2)
CREATININE, URINE: 51 MG/DL
GFR SERPL CREATININE-BSD FRML MDRD: > 90 ML/MIN/1.73M2
GLUCOSE BLD-MCNC: 103 MG/DL (ref 70–108)
HBA1C MFR BLD: 7.2 % (ref 4.3–5.7)
HDLC SERPL-MCNC: 44 MG/DL
LDL CHOLESTEROL CALCULATED: 119 MG/DL
MICROALBUMIN UR-MCNC: 1.32 MG/DL
MICROALBUMIN/CREAT UR-RTO: 26 MG/G (ref 0–30)
POTASSIUM SERPL-SCNC: 4.1 MEQ/L (ref 3.5–5.2)
SODIUM BLD-SCNC: 138 MEQ/L (ref 135–145)
T4 FREE: 1.89 NG/DL (ref 0.93–1.76)
TRIGL SERPL-MCNC: 69 MG/DL (ref 0–199)
TSH SERPL DL<=0.05 MIU/L-ACNC: < 0.005 UIU/ML (ref 0.4–4.2)

## 2021-05-20 PROCEDURE — 83036 HEMOGLOBIN GLYCOSYLATED A1C: CPT | Performed by: INTERNAL MEDICINE

## 2021-05-20 PROCEDURE — 99214 OFFICE O/P EST MOD 30 MIN: CPT | Performed by: INTERNAL MEDICINE

## 2021-05-20 PROCEDURE — 3051F HG A1C>EQUAL 7.0%<8.0%: CPT | Performed by: INTERNAL MEDICINE

## 2021-05-20 RX ORDER — LISINOPRIL 2.5 MG/1
TABLET ORAL
Qty: 15 TABLET | Refills: 5 | Status: SHIPPED | OUTPATIENT
Start: 2021-05-20 | End: 2021-06-02 | Stop reason: ALTCHOICE

## 2021-05-20 RX ORDER — PRAVASTATIN SODIUM 20 MG
20 TABLET ORAL DAILY
Qty: 30 TABLET | Refills: 5 | Status: SHIPPED | OUTPATIENT
Start: 2021-05-20 | End: 2021-12-13

## 2021-05-20 SDOH — ECONOMIC STABILITY: FOOD INSECURITY: WITHIN THE PAST 12 MONTHS, THE FOOD YOU BOUGHT JUST DIDN'T LAST AND YOU DIDN'T HAVE MONEY TO GET MORE.: NEVER TRUE

## 2021-05-20 ASSESSMENT — PATIENT HEALTH QUESTIONNAIRE - PHQ9
SUM OF ALL RESPONSES TO PHQ QUESTIONS 1-9: 0
2. FEELING DOWN, DEPRESSED OR HOPELESS: 0

## 2021-05-20 ASSESSMENT — SOCIAL DETERMINANTS OF HEALTH (SDOH): HOW HARD IS IT FOR YOU TO PAY FOR THE VERY BASICS LIKE FOOD, HOUSING, MEDICAL CARE, AND HEATING?: VERY HARD

## 2021-05-20 NOTE — LETTER
[address] Internal Medicine  [facility]  [address] [place] [id number]  Phone: [phone]  Fax: Ani Pittman MD    May 31, 2021     Dat Sellers  [id number] [facility]    Patient: Finesse Edward   MR Number: [id number]   YOB: 1980   Date of Visit: 5/20/2021       Dear Dat Sellers:    Thank you for referring Don Fraser to me for evaluation/treatment. Below are the relevant portions of my assessment and plan of care. If you have questions, please do not hesitate to call me. I look forward to following Lisette Fry along with you.     Sincerely,        Ramirez Fish MD

## 2021-05-20 NOTE — PROGRESS NOTES
1980    Chief Complaint   Patient presents with    Diabetes     6 months     Hyperlipidemia    Graves' Disease       Pt is a 36 y.o. female who presents for endo issues- 6 month follow up. PCP Dr. Benji Orosco. 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 She is working too many hours, plus keeping up with her own house and kids - not really taking care of herself, ie eating regularly, getting labs done. I asked her to repeat thyroid labs in 11/2020 due to concerns for hyperthyroidism vs biotin use falsifying results. She never did it. Reminded her how important this is to do ASAP. Type I Diabetes - HgA1c 7.2 5/2021 - acceptable average. But some episodes of hypoglycemia when working due to not eating regularly. Reminded her to take care of herself - set alarm on phone to stop to eat even when she is busy at restaurant. Pump out of warranty. Will be getting new pump - will do paperwork. Did discuss now format with [employer] pump on latest model. She is again not taking lisinopril. Normal kidney function 11/2020, and slightly elevated microalbumin/Cr 3/2020 at 32 - she was to be on lisinopril. She doesn't like to take medication and stopped it again - due for microalbumin.  9/2019 (want <100 in DM patient)- was agreeable to statin (mother had another MI). See discussion below.  3/2020 on pravastatin. But stopped it in the spring due to shoulder pain - doubt this caused shoulder pain - she had abnormal MRI to account for shoulder pain. Strongly encouraged her to restart pravastatin. Getting massage, ultrasound, cupping, 2 cortisone shots - no improvement - OIO and chiropractor - can't take time off work.  11/2020 - recommended getting back on statin. No foot lesions, no neuropathy  Autonomic dysfunction - patient denies  Eye exam done 4/18/19 -Ricardo 45 - no DM retinopathy. Reminded her to repeat yearly.   She has it set up for June 2021.  On insulin pump, sees Coco Hobbs DM educator at DM clinic    She is actively checking FSBS 4-5x daily. She has been extending her CGM sensor x 3 weeks due to pain with insertion of needle. Some numbers not reliable on sensor readings. She has a consistent KRISTIN phenomenon that is easily seen on her cgm. Coco Hobbs to follow data and see if nighttime highs continue. She has a CGM to warn her when <70 - she typically feels it prior to low. She set her alarm at 200 instead of 250 to correct it sooner - will drink more water. Graves disease/Hyperthyroid -previously normal - off Tapazole x 3-4 years. Previously noted enlarged thyroid on exam.  Checked thyroid ultrasound. No nodule seen on normal ultrasound 2/21/19. TSH normal 9/2019. On Thyromin - OTC supplement - [email or web address]. It has iodine and animal thyroid. No palpitations tremors. Not on any Thyromin. TSH suppressed and elevated FT4 at 2.0 11/2020 - recommended repeat labs off Biotin to confirm this is accurate as asymptomatic. She will do today. Hypercholesterolemia - She only did Lipitor a couple days due to constipation and stomach cramps/leg cramp. Then we changed to Crestor. She took for 2 weeks but stopped as she had mental fogginess - paid mortgage twice and forgot kid at school, trouble with names. She was on pravastatin LDL improved to 114 3/2020 - she stopped it  11/2020. Again asked her to restart pravastatin. Obesity - BMI 31.57 -> 32.86 - continue to work on diet and exercise. Right shoulder pain -saw OIO at [facility] - MRI done bicep inflammation, and arthritis, strained RTC from over use, 7/2020 did PT for shoulder, 2 cortisone injections. See above. She stopped statin in spring due to pain. At last visit she said she would restart it, but not on it.        Past Medical History:   Diagnosis Date    Abnormal Pap smear     Diabetes mellitus ([facility] [address] 9/12    type I    Graves disease     Hyperlipidemia     Infertility, female     klomid       Current Outpatient Medications   Medication Sig Dispense Refill    insulin aspart (NOVOLOG) 100 UNIT/ML injection vial Use for continuous infusion in insulin pump . Max dose 100 units daily . 3 vial 5    lisinopril (ZESTRIL) 2.5 MG tablet 1/2 tablet daily (Patient not taking: Reported on 5/20/2021) 15 tablet 5    pravastatin (PRAVACHOL) 20 MG tablet Take 1 tablet by mouth daily (Patient not taking: Reported on 5/20/2021) 30 tablet 5    Omega-3 1000 MG CAPS Take 1,000 mg by mouth daily (Patient not taking: Reported on 5/20/2021)      NONFORMULARY Take 1 tablet by mouth daily Thyromin supplement (Patient not taking: Reported on 5/20/2021)      Cholecalciferol (VITAMIN D3) 5000 units CAPS Take 1 capsule by mouth daily (Patient not taking: Reported on 5/20/2021)       No current facility-administered medications for this visit. Allergies   Allergen Reactions    Crestor [Rosuvastatin Calcium]      Mental fogginess    Lipitor [Atorvastatin Calcium]      Constipation, stomach and leg cramps     Review of Systems - General ROS: negative for - chills or fever  Psychological ROS: negative for - anxiety and depression - increased stress  Hematological and Lymphatic ROS: No history of blood clots or bleeding disorder.    Respiratory ROS: no cough, shortness of breath, or wheezing  Cardiovascular ROS: no chest pain or dyspnea on exertion, tolerates a flight of stairs, vacuuming  Gastrointestinal ROS: no abdominal pain, change in bowel habits, or black or bloody stools  Genito-Urinary ROS: no dysuria, trouble voiding, or hematuria  Musculoskeletal ROS: negative for - muscle pain or muscular weakness - right shoulder pain  Neurological ROS: negative for - headaches, seizures or weakness  Dermatological ROS: negative for - rash or skin lesion changes    Blood pressure 124/68, pulse 74, temperature 97.3 °F (36.3 °C), height 5' 10\" (1.778 m), weight 229 lb (103.9 kg), not currently breastfeeding. Physical Examination: General appearance -alert, well appearing, and in no distress  Mental status - alert, oriented to person, place, and time  Neck - supple, no significant adenopathy, mild thyromegaly  Chest - clear to auscultation, no wheezes, rales or rhonchi, symmetric air entry  Heart - normal rate, regular rhythm, normal S1, S2, no murmurs, rubs, clicks or gallops  Abdomen -soft, nontender, nondistended  Neurological - alert, oriented, normal speech and gait. Extremities - peripheral pulses normal, no pedal edema, no clubbing or cyanosis  Skin - warm and dry    Foot exam 11/16/20: No foot lesions, normal sensation with monofilament testing bilaterally. +2 DP and PT pulses bilaterally. Diagnostic data:   I have reviewed recent diagnostic testing including labs 11/16/20, HgA1c from today with patient. Assessment and Plan:      Diagnosis Orders   1. Type 1 diabetes mellitus with microalbuminuria (HCC)  POCT glycosylated hemoglobin (Hb A1C)    12466 - Collection Capillary Blood Specimen    lisinopril (ZESTRIL) 2.5 MG tablet   2. Type 1 diabetes mellitus without complication (HCC)  insulin aspart (NOVOLOG) 100 UNIT/ML injection vial   3. Pure hypercholesterolemia  pravastatin (PRAVACHOL) 20 MG tablet     DM with elevated micralbumin - HgA1c 7.2 5/2021, acceptable - pump to be adjusted per Job Most in DM clinic - to see next week. Reminded her to get eye exam yearly - it is set up for June. Get back on lisinopril and monitor BP. Re-start pravastatin. Work on eating regularly. Will need to get new pump - she is working with [employer] and they will send orders to me. Hyperlipidemia - LDL elevated 194 9/2019 on no medication - educated that we would like to see DM on statin and LDL <100. She didn't tolerate Lipitor or Crestor. Started pravastatin and  on it. Off pravastatin LDL back up to 153.   Restart Pravastatin - not going to increase dose till she states that she tolerates

## 2021-05-21 LAB — T3 FREE: 4.54 PG/ML (ref 2.02–4.43)

## 2021-05-23 LAB — TSH RECEPTOR AB: 2.97 IU/L

## 2021-05-27 ENCOUNTER — OFFICE VISIT (OUTPATIENT)
Dept: INTERNAL MEDICINE CLINIC | Age: 41
End: 2021-05-27
Payer: COMMERCIAL

## 2021-05-27 VITALS
DIASTOLIC BLOOD PRESSURE: 75 MMHG | HEART RATE: 66 BPM | HEIGHT: 70 IN | SYSTOLIC BLOOD PRESSURE: 126 MMHG | BODY MASS INDEX: 32.84 KG/M2 | TEMPERATURE: 97.2 F | WEIGHT: 229.4 LBS

## 2021-05-27 DIAGNOSIS — E10.9 TYPE 1 DIABETES MELLITUS WITHOUT COMPLICATION (HCC): ICD-10-CM

## 2021-05-27 PROCEDURE — G0108 DIAB MANAGE TRN  PER INDIV: HCPCS | Performed by: INTERNAL MEDICINE

## 2021-05-27 NOTE — PATIENT INSTRUCTIONS
Keep doing what you are doing  Make sure your health take top priority     -put a bottle of water and your thyroid and lisinopril medication in the             bathroom             --take first thing when you walk in the bathroom  Keep thinking about getting the COVID vaccine

## 2021-05-27 NOTE — PROGRESS NOTES
[facility]  [address] Elisa Curran, [address]  [phone] (phone)  [phone] (fax)    Patient ID: Rowan Braxton 1980  Referring Provider: Dr. Aly Mckeon     Patient's name and  were verified. Subjective:    She presents for Her follow-up diabetic visit. She has type 1 diabetes mellitus. Home regimen includes: Insulin She is compliant most of the time. Assessment:     Lab Results   Component Value Date    LABA1C 7.2 2021    LABA1C 8.2 10/01/2018    BUN 11 2021    CREATININE 0.6 2021     There were no vitals filed for this visit. Wt Readings from Last 3 Encounters:   21 229 lb (103.9 kg)   20 220 lb (99.8 kg)   20 220 lb 3.2 oz (99.9 kg)     Ht Readings from Last 3 Encounters:   21 5' 10\" (1.778 m)   20 5' 10\" (1.778 m)   20 5' 10\" (1.778 m)       Glucose at 2 hrs PPD today resulted at 146mg/dl  Current monitoring regimen: home blood tests - 4 times daily; [employer]onic CGM    Home blood sugar trends: see insulin pump download  Any episodes of hypoglycemia? yes - 2 times per week; usually at work  Previous visit with dietician: remote  Current diet: B [employer] protein shake or cereal                     L small/ frequent snacks --often protein - egg/ cheese/ yogurt or cottage cheese/ meat and cheese  Current exercise: ADL's-very active 6 days per week  Eye exam current (within one year): no. 3/2020. Appt 2020  Any history of foot problems? no  Last foot exam: 2020  Immunizations up to date: no  Taking ASA:  No - If not why? Not indicated  Appropriate for use of MyChart Glucose Grid:  Yes    Focus:     Diabetes education. Recent A1C 7.2%. Stacia Ramesh is working up to 18 hour days; 5-6 days per week. 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 She is not sitting down to eat, but grabbing snacks as able.  Overall, her snack options are fairly good and usually more protein than carb. She is hoping that the next 1-2 months will bring changes. They have cut some restaurant hours to function better, but she feels there are no other options right now. Considering how hectic her days are, she is doing fairly well. Follow up 6 months. To download insulin pump and call if having issues. DSME PLAN:   Discussed general issues about diabetes pathophysiology and management. Counseling at today's visit: BG goals; carbs; exercise; pattern management; basal vs bolus/temp  basal.   Keep doing what you are doing  Make sure your health take top priority     -put a bottle of water and your thyroid and lisinopril medication in the             bathroom             --take first thing when you walk in the bathroom  Keep thinking about getting the COVID vaccine    Meter download, medications, PMH and nursing assessment reviewed. Morgan Stahl states She is willing to participate in this plan of care and verbalized understanding of all instructions provided. Teach back used to verify comprehension. Total time involved in direct patient education: 60 minutes.

## 2021-06-01 ENCOUNTER — TELEPHONE (OUTPATIENT)
Dept: INTERNAL MEDICINE CLINIC | Age: 41
End: 2021-06-01

## 2021-06-01 DIAGNOSIS — E05.00 GRAVES DISEASE: Primary | ICD-10-CM

## 2021-06-01 DIAGNOSIS — E78.00 PURE HYPERCHOLESTEROLEMIA: ICD-10-CM

## 2021-06-01 NOTE — TELEPHONE ENCOUNTER
Left message to call the for medication change and to schedule appointment for today at 12:00 PM or Thursday at 8:00 AM.

## 2021-06-01 NOTE — TELEPHONE ENCOUNTER
----- Message from Richard Correia MD sent at 5/31/2021 12:05 AM EDT -----  Need her to come in and discuss labs - hyperthyroid. Start Atenolol 25 mg daily - stop lisinopril. Monitor BP and HR on Atenolol. Have her come in at 12:00 on Tues, or 8:00 Thurs.

## 2021-06-02 RX ORDER — ATENOLOL 25 MG/1
25 TABLET ORAL DAILY
COMMUNITY
End: 2021-06-02 | Stop reason: SDUPTHER

## 2021-06-02 RX ORDER — ATENOLOL 25 MG/1
25 TABLET ORAL DAILY
Qty: 90 TABLET | Refills: 1 | OUTPATIENT
Start: 2021-06-02 | End: 2021-12-13

## 2021-06-02 NOTE — TELEPHONE ENCOUNTER
Spoke with patient and she is refusing to come into the office . Patient states that\" she is too busy for all of this \" ! informed patient that you needed to discuss her lab results for her hyperthyroid and patient wants to know if it is possible for you to just call her and discuss . Informed patient that you want her to start Atenolol 25 mg daily and stop the Lisinopril . Patient states that she is not taking the Lisinopril and she is not sure she is on board with the changes . I instructed patient that I will call in the Atenolol to her pharmacy so it will be available for her . Please advise .

## 2021-06-03 NOTE — TELEPHONE ENCOUNTER
I tried to call patient directly and unable to get through. Please ask if and when she restarted her iodine pills. Her numbers are hyperthyroid - normally would treat with Atenolol but as not tachycardic she can chose not to take this. But I would suggested either getting back on iodine or Tapazole to lower thyroid hormone (I favor Tapazole but she will want to try iodine most likely as has seemed to help in the past). Need repeat TSH, FT4 in a month. Microalbumin normal - no need for lisinopril now. Her LDL is high at 119 (want <100 in DM patient)- would suggest re-starting pravastatin (I have given in past but she doesn't stay on anything long term except insulin). If restart statin - repeat ALT and LDL in 2-3 months.

## 2021-06-09 NOTE — TELEPHONE ENCOUNTER
Spoke with patient and she denies having any missed calls from the office . I went over results and recommendations and patient states that she want to think about it and discuss with her  and hung up on me . I ordered the labs and write the instruction when to get based on her decisions and mailed to patient .

## 2021-06-11 NOTE — TELEPHONE ENCOUNTER
Please make sure we touch base on this issue - we need to know her plan. Can refer to Endo if she wants to but wait is long and would like to put in referral ASAP if she wants this. She can't stay hyperthyroid like she is (it has been 7 months that she has been high) - we need to decide on treatment. Again I recommend she start Tapazole, I will send in to pharmacy if she is willing to start it.

## 2021-06-30 NOTE — TELEPHONE ENCOUNTER
Left message for patient that we need to know what her plan is for treating the hyperthyroid. We can refer to Endocrinology or Dr. Younger Bolus would like her to start Tapazole and would call in to pharmacy if she is willing to start . Please call the office back .

## 2021-12-13 ENCOUNTER — OFFICE VISIT (OUTPATIENT)
Dept: INTERNAL MEDICINE CLINIC | Age: 41
End: 2021-12-13
Payer: COMMERCIAL

## 2021-12-13 VITALS
BODY MASS INDEX: 33.14 KG/M2 | HEIGHT: 70 IN | HEART RATE: 64 BPM | WEIGHT: 231.5 LBS | SYSTOLIC BLOOD PRESSURE: 122 MMHG | TEMPERATURE: 97.2 F | DIASTOLIC BLOOD PRESSURE: 68 MMHG

## 2021-12-13 VITALS
HEIGHT: 70 IN | DIASTOLIC BLOOD PRESSURE: 70 MMHG | BODY MASS INDEX: 30.49 KG/M2 | TEMPERATURE: 97.3 F | HEART RATE: 71 BPM | WEIGHT: 213 LBS | SYSTOLIC BLOOD PRESSURE: 139 MMHG

## 2021-12-13 DIAGNOSIS — E10.9 TYPE 1 DIABETES MELLITUS WITHOUT COMPLICATION (HCC): Primary | ICD-10-CM

## 2021-12-13 DIAGNOSIS — R80.9 TYPE 1 DIABETES MELLITUS WITH MICROALBUMINURIA (HCC): Primary | ICD-10-CM

## 2021-12-13 DIAGNOSIS — E05.90 HYPERTHYROIDISM: ICD-10-CM

## 2021-12-13 DIAGNOSIS — E10.29 TYPE 1 DIABETES MELLITUS WITH MICROALBUMINURIA (HCC): Primary | ICD-10-CM

## 2021-12-13 DIAGNOSIS — I10 ESSENTIAL HYPERTENSION: ICD-10-CM

## 2021-12-13 DIAGNOSIS — E78.5 HYPERLIPIDEMIA, UNSPECIFIED HYPERLIPIDEMIA TYPE: ICD-10-CM

## 2021-12-13 LAB — HBA1C MFR BLD: 6.7 % (ref 4.3–5.7)

## 2021-12-13 PROCEDURE — G0108 DIAB MANAGE TRN  PER INDIV: HCPCS | Performed by: INTERNAL MEDICINE

## 2021-12-13 PROCEDURE — 99214 OFFICE O/P EST MOD 30 MIN: CPT | Performed by: STUDENT IN AN ORGANIZED HEALTH CARE EDUCATION/TRAINING PROGRAM

## 2021-12-13 NOTE — PROGRESS NOTES
Patient Name: Luann Flores  YOB: 1980  MRN: [id number]  Office visit date: 2021    Chief Complaint: Diabetes, Hypertension, and Hyperlipidemia      Subjective/Objective:    HPI:     Luann Flores is a 39 y.o. female who presents for an established patient visit. She is here for evaluation of Diabetes, Hypertension, and Hyperlipidemia    Type 1 DM: have been using insulin pump. Last A1C 21 was 6.7. Glucose home reading were 120s-130s. Denied any episode of hypoglycemia. Seen in diabetes clinic this morning with normal foot exam    Hyperthyroidism: off medication and over the counter supplement for year. Denied palpitation, diarrhea, hot intolerance. HTN: BP has been well control with systolic -274M. No medication but diet and weight loose    Hyperlipidemia: not taking medication last 12 months but tried diet control and weight loss. Past Medical History:   Diagnosis Date    Abnormal Pap smear     Diabetes mellitus ([facility] [address] 2012    type I    Graves disease     Hyperlipidemia     Infertility, female        Past Surgical History:   Procedure Laterality Date    ANTERIOR CRUCIATE LIGAMENT REPAIR  1996     SECTION      SAMMY  2006    WISDOM TOOTH EXTRACTION  2003       Current Outpatient Medications   Medication Sig Dispense Refill    insulin aspart (NOVOLOG) 100 UNIT/ML injection vial Use for continuous infusion in insulin pump . Max dose 100 units daily . 30 mL 5     No current facility-administered medications for this visit.        Allergies   Allergen Reactions    Crestor [Rosuvastatin Calcium]      Mental fogginess    Lipitor [Atorvastatin Calcium]      Constipation, stomach and leg cramps       Social History     Socioeconomic History    Marital status:      Spouse name: Not on file    Number of children: Not on file    Years of education: Not on file    Highest education level: Not on file   Occupational History    Occupation: Housewife   Tobacco Use    Smoking status: Former Smoker     Packs/day: 0.30     Years: 2.00     Pack years: 0.60     Quit date: 2002     Years since quittin.9    Smokeless tobacco: Never Used   Substance and Sexual Activity    Alcohol use: Yes     Alcohol/week: 0.8 standard drinks     Types: 1 Standard drinks or equivalent per week     Comment: Occasionally    Drug use: No    Sexual activity: Yes     Partners: Male   Other Topics Concern    Not on file   Social History Narrative    ** Merged History Encounter **          Social Determinants of Health     Financial Resource Strain: High Risk    Difficulty of Paying Living Expenses: Very hard   Food Insecurity: No Food Insecurity    Worried About Running Out of Food in the Last Year: Never true    Sheree of Food in the Last Year: Never true   Transportation Needs:     Lack of Transportation (Medical): Not on file    Lack of Transportation (Non-Medical):  Not on file   Physical Activity:     Days of Exercise per Week: Not on file    Minutes of Exercise per Session: Not on file   Stress:     Feeling of Stress : Not on file   Social Connections:     Frequency of Communication with Friends and Family: Not on file    Frequency of Social Gatherings with Friends and Family: Not on file    Attends Latter-day Services: Not on file    Active Member of [address] [place] [address] [employer] or Organizations: Not on file    Attends Club or Organization Meetings: Not on file    Marital Status: Not on file   Intimate Partner Violence:     Fear of Current or Ex-Partner: Not on file    Emotionally Abused: Not on file    Physically Abused: Not on file    Sexually Abused: Not on file   Housing Stability:     Unable to Pay for Housing in the Last Year: Not on file    Number of [place] in the Last Year: Not on file    Unstable Housing in the Last Year: Not on file       Family History   Problem Relation Age of Onset    Heart Disease Mother 46        5 bypass CABBG  High Blood Pressure Mother         FMD    High Cholesterol Mother     Other Mother         Heart stent   Rebbecca Hinders Migraines Mother     Thyroid Disease Father         Hypothyroidism    Other Father         AAA    Stroke Maternal Grandmother 66    Diabetes Maternal Grandmother     Cancer Maternal Grandmother 79        Prostate    Hearing Loss Maternal Grandmother     Cancer Maternal Grandfather 80        Prostate Cancer    Miscarriages / Stillbirths Sister        Review of Systems  Constitutional: Negative for appetite change, chills, diaphoresis, fever and unexpected weight change. HENT: Negative for congestion, dental problem, mouth sores, nosebleed  Respiratory: Negative for choking, chest tightness, wheezing and stridor. Cardiovascular: Negative for chest pain and palpitations. Gastrointestinal: Negative for anal bleeding, diarrhea, nausea and vomiting. Genitourinary: Negative for dysuria, flank pain, hematuria and urgency. Psychiatric/Behavioral: Negative for agitation, behavioral problems, confusion, decreased concentration, dysphoric mood and hallucinations. Vitals:    12/13/21 1432   BP: 122/68   Site: Right Upper Arm   Pulse: 64   Temp: 97.2 °F (36.2 °C)   Weight: 231 lb 8 oz (105 kg)   Height: 5' 10\" (1.778 m)       Wt Readings from Last 3 Encounters:   12/13/21 231 lb 8 oz (105 kg)   12/13/21 213 lb (96.6 kg)   05/27/21 229 lb 6.4 oz (104.1 kg)       BP Readings from Last 3 Encounters:   12/13/21 122/68   12/13/21 139/70   05/27/21 126/75       Physical Exam  Vitals reviewed. Constitutional:       General: He is not in acute distress. Appearance: Normal appearance. He is normal weight. He is not ill-appearing, toxic-appearing or diaphoretic. HENT:      Head: Normocephalic and atraumatic. Right Ear: Tympanic membrane, ear canal and external ear normal.      Left Ear: Tympanic membrane, ear canal and external ear normal.      Nose: Rhinorrhea present. No congestion. Mouth: Mucous membranes are moist.      Pharynx: Oropharynx is clear. No oropharyngeal exudate or posterior oropharyngeal erythema. Neck:      Vascular: No carotid bruit. Cardiovascular:      Rate and Rhythm: Normal rate and regular rhythm. Pulses: Normal pulses. Heart sounds: Normal heart sounds. No murmur heard. No friction rub. No gallop. Pulmonary:      Effort: No respiratory distress. Breath sounds: No stridor. Rales present. No wheezing or rhonchi. Chest:      Chest wall: No tenderness. Abdominal:      General: Abdomen is flat. Bowel sounds are normal. There is no distension. Palpations: Abdomen is soft. There is no mass. Tenderness: There is no abdominal tenderness. There is no right CVA tenderness, left CVA tenderness, guarding or rebound. Hernia: No hernia is present. Musculoskeletal:         General: No swelling, tenderness, deformity or signs of injury. Normal range of motion. Cervical back: Normal range of motion and neck supple. No rigidity or tenderness. Right lower leg: No edema. Left lower leg: No edema. Lymphadenopathy:      Cervical: No cervical adenopathy. Diagnostic data: Last A1C 12/13/21 6.7  I have reviewed recent diagnostic testing including labs with the patient today. Controlled Substances Monitoring:       Laboratory/imaging studies ordered this visit: CBC, CMP, TSH, Free T3, Free T4, Fasting Lipid panel    Assessment/Plan:  1. Type 1 diabetes mellitus without complication (HCC)  -     insulin aspart (NOVOLOG) 100 UNIT/ML injection vial; Use for continuous infusion in insulin pump . Max dose 100 units daily . , Disp-30 mL, R-5Normal  -     Comprehensive Metabolic Panel; Future    2. Hyperthyroidism  -     TSH; Future  -     T3, Free; Future  -     T4, Free; Future  -     Patient was encouraged to take her hyperthyroid medication but patient has been refused to take it over last year.  Had been tried some over the counter supplement for hyperthyroidism but didn't take any over last 3 months. 3. Essential hypertension        -      Currently BP has been well control with BP 120s/70-80s. Plan: cont diet control, weight loss and CBC    4. Hyperlipidemia, unspecified hyperlipidemia type  -     Lipid, Fasting; Future      Return in about 6 months (around 6/13/2022). All findings, labs and other data reviewed, assessment and plan created with review by Dr. Loly Garcia MD.    Electronically signed by: Elier Orozco DO on 12/13/2021 at 3:36 PM  (Please note that portions of this note were completed with a voice recognition program and electronically transcribed. Efforts were made to edit the dictations but occasionally words are mis-transcribed. This transcription may contain errors not detected in proofreading.  This transcription was electronically signed.)

## 2021-12-13 NOTE — PROGRESS NOTES
[facility]  [address] [facility], [address]  [phone] (phone)  [phone] (fax)    Patient ID: Pavel Pedraza 1980  Referring Provider: Dr. Clark Gutierrez     Patient's name and  were verified. Subjective:    She presents for Her follow-up diabetic visit. She has type 1 diabetes mellitus. Home regimen includes: Insulin She is compliant most of the time. Assessment:     Lab Results   Component Value Date    LABA1C 7.2 2021    LABA1C 8.2 10/01/2018    BUN 11 2021    CREATININE 0.6 2021     There were no vitals filed for this visit. Wt Readings from Last 3 Encounters:   21 229 lb 6.4 oz (104.1 kg)   21 229 lb (103.9 kg)   20 220 lb (99.8 kg)     Ht Readings from Last 3 Encounters:   21 5' 10\" (1.778 m)   21 5' 10\" (1.778 m)   20 5' 10\" (1.778 m)       Glucose at 10 min PPD today resulted at 137mg/dl  Current monitoring regimen: home blood tests - 4 times daily; Guardian CGM  Home blood sugar trends: see [place] download with CGM. Glucose excursions up to 300 after meals. Random lows between meals. FBS's 110's-130's  Any episodes of hypoglycemia?  yes - 1-2 times per week; no patterns noted  Depression screening completed 2021  Previous visit with dietician: remote  Current diet: B everything bagel/ cream cheese                                        Eggs/ toast                       L 12-3pm or brunch                              Protein/ carbs                       D 5-6pm Sun-Mon-Tues-                                              Family meal/ or eat out                                    Wed-meat/ starch/ veg                                     Fri/ Sat quick snack -sandwich,                                                             Cottage cheese  Current exercise: ADL's--working at [employer] exam current (within one year): yes 21 [facility]  Any history of foot problems? no  Last foot exam: 12/13/21 Pedal pulses:   peripheral pulses symmetrical   Results of monofilament test: 10/10              Skin noted to be warm and hair is reduced. Immunizations up to date: no; refuses  Taking ASA:  No   Appropriate for use of MyChart Glucose Grid:  Yes    Focus:     Diabetes education. A1C today 6.7%. 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 She is doing better at taking some time for herself and her family, but most days are unpredictable and hectic. She is bolusing as she eats contributing to significant spikes in her blood sugars with meals, but her job does not allow her to bolus early as she is often interrupted and does not get to start or complete her meal. Lyumjev could be beneficial for more rapid acting insulin, but is not covered by her insurance. Reviewed temp basal vs temp target, manual mode vs automode and basal vs bolus. Because activity levels are unknown, it is difficult to establish a plan that avoids risk of low BS's. Follow up 6 months per patient request.     DSME PLAN:   Discussed general issues about diabetes pathophysiology and management. Counseling at today's visit: BG goals; carbs; exercise; txing low BS. Call your insurance company --is Lyumjev covered as well as Novolog? Bolus for your carbs!               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 Tiny Dillon states She is willing to participate in this plan of care and verbalized understanding of all instructions provided. Teach back used to verify comprehension. Total time involved in direct patient education: 60 minutes.

## 2021-12-13 NOTE — PATIENT INSTRUCTIONS
Call your insurance company --is Lyumjev covered as well as Novolog?   Bolus for your carbs!               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

## 2022-02-10 DIAGNOSIS — E10.9 TYPE 1 DIABETES MELLITUS WITHOUT COMPLICATION (HCC): ICD-10-CM

## 2022-06-13 ENCOUNTER — TELEPHONE (OUTPATIENT)
Dept: INTERNAL MEDICINE CLINIC | Age: 42
End: 2022-06-13

## 2022-06-13 ENCOUNTER — OFFICE VISIT (OUTPATIENT)
Dept: INTERNAL MEDICINE CLINIC | Age: 42
End: 2022-06-13
Payer: COMMERCIAL

## 2022-06-13 VITALS
TEMPERATURE: 97.2 F | WEIGHT: 229.4 LBS | SYSTOLIC BLOOD PRESSURE: 102 MMHG | DIASTOLIC BLOOD PRESSURE: 62 MMHG | HEART RATE: 80 BPM | HEIGHT: 70 IN | BODY MASS INDEX: 32.84 KG/M2

## 2022-06-13 VITALS
BODY MASS INDEX: 32.84 KG/M2 | SYSTOLIC BLOOD PRESSURE: 102 MMHG | HEART RATE: 80 BPM | WEIGHT: 229.4 LBS | DIASTOLIC BLOOD PRESSURE: 62 MMHG | TEMPERATURE: 97.2 F | HEIGHT: 70 IN

## 2022-06-13 DIAGNOSIS — M79.672 BILATERAL FOOT PAIN: ICD-10-CM

## 2022-06-13 DIAGNOSIS — M79.671 BILATERAL FOOT PAIN: ICD-10-CM

## 2022-06-13 DIAGNOSIS — E10.9 TYPE 1 DIABETES MELLITUS WITHOUT COMPLICATION (HCC): Primary | ICD-10-CM

## 2022-06-13 DIAGNOSIS — E78.00 PURE HYPERCHOLESTEROLEMIA: ICD-10-CM

## 2022-06-13 DIAGNOSIS — E05.90 HYPERTHYROIDISM: ICD-10-CM

## 2022-06-13 LAB
CREATININE, URINE: 21.1
HBA1C MFR BLD: 6.8 %
LDL CHOLESTEROL DIRECT: 131 MG/DL
MICROALBUMIN/CREAT 24H UR: <1.2 MG/G{CREAT}
MICROALBUMIN/CREAT UR-RTO: <57

## 2022-06-13 PROCEDURE — 95251 CONT GLUC MNTR ANALYSIS I&R: CPT | Performed by: INTERNAL MEDICINE

## 2022-06-13 PROCEDURE — 3044F HG A1C LEVEL LT 7.0%: CPT | Performed by: INTERNAL MEDICINE

## 2022-06-13 PROCEDURE — 99214 OFFICE O/P EST MOD 30 MIN: CPT | Performed by: INTERNAL MEDICINE

## 2022-06-13 PROCEDURE — G0108 DIAB MANAGE TRN  PER INDIV: HCPCS | Performed by: INTERNAL MEDICINE

## 2022-06-13 SDOH — ECONOMIC STABILITY: FOOD INSECURITY: WITHIN THE PAST 12 MONTHS, THE FOOD YOU BOUGHT JUST DIDN'T LAST AND YOU DIDN'T HAVE MONEY TO GET MORE.: NEVER TRUE

## 2022-06-13 SDOH — ECONOMIC STABILITY: FOOD INSECURITY: WITHIN THE PAST 12 MONTHS, YOU WORRIED THAT YOUR FOOD WOULD RUN OUT BEFORE YOU GOT MONEY TO BUY MORE.: NEVER TRUE

## 2022-06-13 ASSESSMENT — PATIENT HEALTH QUESTIONNAIRE - PHQ9
2. FEELING DOWN, DEPRESSED OR HOPELESS: 0
SUM OF ALL RESPONSES TO PHQ9 QUESTIONS 1 & 2: 0
SUM OF ALL RESPONSES TO PHQ QUESTIONS 1-9: 0
1. LITTLE INTEREST OR PLEASURE IN DOING THINGS: 0
SUM OF ALL RESPONSES TO PHQ QUESTIONS 1-9: 0

## 2022-06-13 ASSESSMENT — SOCIAL DETERMINANTS OF HEALTH (SDOH): HOW HARD IS IT FOR YOU TO PAY FOR THE VERY BASICS LIKE FOOD, HOUSING, MEDICAL CARE, AND HEATING?: SOMEWHAT HARD

## 2022-06-13 NOTE — PATIENT INSTRUCTIONS
Make appointment to get your eyes checked in July  Consider carb ratio of 3.8 grams for your evening meal           --move dinner carb ratio 5-10pm to focus on meals eaten at the                End of your work day/ after work  Consider bolusing for your meal 10-15 minute before eating IF you are not working  Keep evening meals as early as possible  Keep following up with your doctors regarding the pain in your feet from work  Use added adhesive for your pump sites when you ar going to be sweating more          --try antiperspirant before putting a site in          --call medtronic about their site adhesion solutions.

## 2022-06-13 NOTE — PROGRESS NOTES
[facility]  [address] [address] [address], [facility], [address]  [phone] (phone)  [phone] (fax)    Patient ID: Sandra Pedraza [id number]  Referring Provider: Dr. Spencer Samples     Patient's name and  were verified. Subjective:    She presents for Her follow-up diabetic visit. She has type 1 diabetes mellitus. Home regimen includes: Insulin She is compliant most of the time. Assessment:     Lab Results   Component Value Date    LABA1C 6.8 2022    BUN 11 2021    CREATININE 0.6 2021     Vitals:    22 1246   BP: 102/62   Site: Left Upper Arm   Position: Sitting   Pulse: 80   Temp: 97.2 °F (36.2 °C)   Weight: 229 lb 6.4 oz (104.1 kg)   Height: 5' 10\" (1.778 m)     Wt Readings from Last 3 Encounters:   22 229 lb 6.4 oz (104.1 kg)   22 229 lb 6.4 oz (104.1 kg)   21 231 lb 8 oz (105 kg)     Ht Readings from Last 3 Encounters:   22 5' 10\" (1.778 m)   22 5' 10\" (1.778 m)   21 5' 10\" (1.778 m)       Diabetes Pharmacotherapy:  Novolog insulin per Medtronic 770G pump     Glucose Trends:   Glucose at 1.5 hrs PPD today resulted at 165mg/dl  Current monitoring regimen: Guardian CGM - checks 4+ times daily  Home blood sugar trends:    -V. High: 7%, High: 17%, Target: 75%, Low: 1%, V. Low: 0%  Any episodes of hypoglycemia?  yes - 2 per week   -Treats with Mt Dew    Lifestyle Factors:   Previous visit with dietician: remote  Current diet: B: 9a banana                              Egg/ toast            L: s berries/ shortcake                       D: turkey sandwich                        Snacks: rare bedtime snack banana/ apple/ junk food                       Beverages:  Current exercise: ADL's; very physical at work    Health Maintenance:  Eye exam current (within one year): yes Date: 7/22/21 [facility]  Any history of foot problems? no  Foot exam up to date: yes Date: 2022 Pedal pulses:   peripheral pulses symmetrical   Results of monofilament test: 10/10              Skin noted to be warm and hair is absent. Pain in heels/ arches after being on her feet most of the day. She has tried inserts/ new walking shoe every 6 months. Immunizations up to date:    Flu: no   Pneumonia: no   COVID-19: no  The 10-year ASCVD risk score (Michelle Landis, et al., 2013) is: 0.9%    Values used to calculate the score:      Age: 39 years      Sex: Female      Is Non- : No      Diabetic: Yes      Tobacco smoker: No      Systolic Blood Pressure: 031 mmHg      Is BP treated: No      HDL Cholesterol: 44 mg/dL      Total Cholesterol: 177 mg/dL   Last panel - LDL:   Lab Results   Component Value Date    LDLCALC 119 05/20/2021    LDLDIRECT 153.12 11/16/2020     Taking ASA:  No   Taking statin: No  Last microalbumin/creatinine ratio:   Microalbumin Creatinine Ratio   Date Value Ref Range Status   03/16/2020 32  Final      Taking ACE/ARB: No  Depression screening completed. 6/13/2022    Focus:   Diabetes Education. Last A1C: 6.8% today. 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 Meals and physical activity levels are quite varied. Overall, with automode, Freddy Busby is doing fairly well. Some glucose excursions could be related to delayed bolus. Her evening meal most consistently is not getting cleared. No pattern noted r/t food choices. She is having to repeatedly treat lows during work hours. Discussed treating with rapid glucose followed by protein for longer holding power. Due to lack of time; 1/2 protein shake may be ideal. She will try this. She is having trouble keeping infusion sites stuck. Reviewed options and instructed to call Medtronic for their infusion set tool kit for sticking. Curriculum Area Focus:  Foot care, Cardiovascular risk management, Carbohydrate counting/meal planning, Hypoglycemia management and

## 2022-06-13 NOTE — PROGRESS NOTES
1980    Chief Complaint   Patient presents with    Diabetes    Hyperthyroidism    Hyperlipidemia    Other     bialteral foot pain-on feet all day    Other     has only been taking insulin-wants to get back on all supplements       Pt is a 39 y.o. female who presents for endo issues- 6 month follow up. PCP Dr. Oscar Jimenez. She follows with DM clinic as well to assist with education and pump management. .    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 Diabetes Type I - noncompliant in getting labs done as ordered. Stressed the importance of getting this done today. Normal kidney function 5/2021, and normal microalbumin/Cr 5/2001 at 26 - she was to be on lisinopril. She doesn't like to take medication and stopped it. Repeat BMP and microalbumin now.  9/2019 (want <100 in DM patient)- was agreeable to statin (mother had another MI). See discussion below.  3/2020 on pravastatin. But stopped it in the spring due to shoulder pain - doubt this caused shoulder pain - she had abnormal MRI to account for shoulder pain. Strongly encouraged her to restart pravastatin. She is not taking it since last visit 6/2022. No foot lesions, no neuropathy  Autonomic dysfunction - patient denies  Eye exam done 7/22/21 - no DM retinopathy. Reminded her to repeat yearly. On insulin pump, sees Tabitha Gutierrez DM educator at DM clinic    She is actively checking FSBS 4-5x daily. She has been extending her CGM sensor x 3 weeks due to pain with insertion of needle. Some numbers not reliable on sensor readings. She has a consistent KRISTIN phenomenon that is easily seen on her cgm. Tabitha Gutierrez to follow data and see if nighttime highs continue. She has a CGM to warn her when <70 - she typically feels it prior to low. She set her alarm at 200 instead of 250 to correct it sooner - will drink more water.    Controlled, type I DM but do worry about lows as she aggressively treats hyperglycemia. HgA1c 6.8 6/13/22 - lows if not eating on time at work, suggested setting alarm on phone to remind her to eat. Saw Tiana Friend today and made change of ratio to 3.8 grams for evening meal.  Bolus 10-15 minutes before eating if not working. They discussed how to get adhesive to work better at pump site. No labs in a year - will change lipid to LDL so won't need to be fasting. Statins make her \"feel like crap\". Plantar fascitis - wants to try Boswellia - takes NSAIDs prn. She has tried exercise and shoes, chiropractor treatment. Chiropractor uses TENS works on shoulder impingement on right and back, massage, ultrasound feet). She has flat feet - suggested insoles. Offer referral to podiatry when she is ready. Graves disease/Hyperthyroid - initially seen by Dr. Prince Mary and was on Tapazole. Then she stopped and labs were normal for awhile. She states at time was taking iodine supplement as well (on Thyromin - OTC supplement - [email or web address]. It has iodine and animal thyroid per patient). She has been off Tapazole since 2/2019 approximately. TSH was normal 9/2019, TSH 0.13 and normal FT4 1.44 3/2020,  11/16/20 TSH was <0.005 and FT4 2.0 (off Thyromin at the time). Asked her to hold Biotin 2 days prior to labs and repeat and she didn't. The next time she did labs was 5/2021 TSH <0.005, FT4 1.89, FT3 4.54. Tried to get her to start Atenolol or methimazole, she didn't feel symptoms and didn't want to start methimazole or Atenolol. I sent script to pharmacy 6/2021 and she refused to start it or see Endo. She chose to restart her Thyromin OTC supplement instead per [employer] message 8/2021. She was told to repeat labs in 2 months. She was seen in resident clinic 12/2021 and didn't get labs done. Today she states she hasn't been taking OTC thyroid medication or methimazole. Agreed to get labs done now. I told her that I need her to treat her hyperthyroidism with medication and see Endo. Repeat labs now and then will set up appropriate next step. She is willing to see Dr. Taya Figueroa. Enlarged thyroid on exam.  Checked thyroid ultrasound. No nodule seen on normal ultrasound 2/21/19. Hypercholesterolemia - She only did Lipitor a couple days due to constipation and stomach cramps/leg cramp. Then we changed to Crestor. She took for 2 weeks but stopped as she had mental fogginess - paid mortgage twice and forgot kid at school, trouble with names. She was on pravastatin LDL improved to 114 3/2020 - she stopped it and never restarted. She states she doesn't like how she feels on statins. Repeat LDL now as not fasting and wants to get labs done today. Obesity - BMI 31.57 -> 32.92 - continue to work on diet and exercise. Right shoulder pain -saw [facility] at [facility] - MRI done bicep inflammation, and arthritis, strained RTC from over use, 7/2020 did PT for shoulder, 2 cortisone injections. Now no longer hurts. She stopped statin in spring 2021 due to pain. But doesn't believe it was causing the pain. Past Medical History:   Diagnosis Date    Abnormal Pap smear     Diabetes mellitus ([address] 09/01/2012    type I    Graves disease     Hyperlipidemia     Infertility, female        Current Outpatient Medications   Medication Sig Dispense Refill    insulin aspart (NOVOLOG) 100 UNIT/ML injection vial Use for continuous infusion in insulin pump . Max dose 100 units daily . 30 mL 5    methIMAzole (TAPAZOLE) 5 MG tablet Take 1 tablet by mouth daily 30 tablet 1     No current facility-administered medications for this visit.        Allergies   Allergen Reactions    Crestor [Rosuvastatin Calcium]      Mental fogginess    Lipitor [Atorvastatin Calcium]      Constipation, stomach and leg cramps       Social History     Socioeconomic History    Marital status:      Spouse name: Not on file    Number of children: Not on file    Years of education: Not on file    Highest education level: chills or fever  Psychological ROS: negative for - anxiety and depression  Hematological and Lymphatic ROS: No history of blood clots or bleeding disorder. Respiratory ROS: no cough, shortness of breath, or wheezing  Cardiovascular ROS: no chest pain or dyspnea on exertion, tolerates a flight of stairs, vacuuming  Gastrointestinal ROS: no abdominal pain, change in bowel habits, or black or bloody stools  Genito-Urinary ROS: no dysuria, trouble voiding, or hematuria  Musculoskeletal ROS: negative for - muscle pain or muscular weakness - see above  Neurological ROS: negative for - headaches, numbness/tingling, seizures or weakness  Dermatological ROS: negative for - rash or skin lesion changes    Blood pressure 102/62, pulse 80, temperature 97.2 °F (36.2 °C), height 5' 10\" (1.778 m), weight 229 lb 6.4 oz (104.1 kg), not currently breastfeeding. Physical Examination: General appearance -alert, well appearing, and in no distress  Mental status - alert, oriented to person, place, and time  Neck - supple, no significant adenopathy, mild thyromegaly  Chest - clear to auscultation, no wheezes, rales or rhonchi, symmetric air entry  Heart - normal rate, regular rhythm, normal S1, S2, no murmurs, rubs, clicks or gallops  Abdomen -soft, nontender, nondistended  Neurological - alert, oriented, normal speech and gait. Extremities - peripheral pulses normal, no pedal edema, no clubbing or cyanosis  Skin - warm and dry    Foot exam 6/13/22 with DM clinic: No foot lesions, normal sensation with monofilament testing bilaterally. +2 DP and PT pulses bilaterally. Diagnostic data:   I have reviewed recent diagnostic testing including labs HgA1c done today with patient. Assessment and Plan:      Diagnosis Orders   1. Type 1 diabetes mellitus without complication (HCC)  LDL Cholesterol, Direct    CBC with Auto Differential    Comprehensive Metabolic Panel    Microalbumin / Creatinine Urine Ratio   2.  Hyperthyroidism  T3, Free    T4, Free    TSH   3. Pure hypercholesterolemia  LDL Cholesterol, Direct    Comprehensive Metabolic Panel   4. Bilateral foot pain       DM - HgA1c 6.8 6/2022, at goal - pump adjusted per Francesco Osorio in DM clinic. Will continue Novolog insulin in pump. Reminded her to get eye exam yearly. Foot exam done today. Get back on lisinopril and monitor BP. Re-start pravastatin. Patient not interested in doing lisinopril or pravastatin. Hyperthyroidism/Graves disease - check TSH, FT4 and FT3. Suggested methimazole and referral to Endo if hyperthyroid on recheck and she was agreeable to repeating labs. Hyperlipidemia - LDL elevated 194 9/2019 on no medication - educated that we would like to see DM on statin and LDL <100. She didn't tolerate Lipitor or Crestor. Started pravastatin and  on it. She has been off it for last one year. I believe we could restart it if needed - order LDL today. Bilateral foot pain - recommended insoles, wear supportive shoes at all times. Follow up in 6 months, will not return sooner. Labs due now. Follow up visit in 6 months. Labs due now. Madison Anthony MD    . [facility] [address] [facility]  [address]  [facility]   [address]  [address]  Dept: [phone]  Dept Fax: [phone]: [phone]

## 2022-06-14 NOTE — TELEPHONE ENCOUNTER
Ana Namrata increase her carb ratio 5p-10pm form 4.0 units to 3.8 units. Ana Namrata voiced understanding of this change via teach back.

## 2022-06-17 ENCOUNTER — TELEPHONE (OUTPATIENT)
Dept: INTERNAL MEDICINE CLINIC | Age: 42
End: 2022-06-17

## 2022-06-17 DIAGNOSIS — E05.90 HYPERTHYROIDISM: Primary | ICD-10-CM

## 2022-06-17 RX ORDER — METHIMAZOLE 5 MG/1
5 TABLET ORAL DAILY
Qty: 30 TABLET | Refills: 1 | Status: SHIPPED | OUTPATIENT
Start: 2022-06-17 | End: 2022-07-17

## 2022-06-17 NOTE — TELEPHONE ENCOUNTER
----- Message from David Monae MD sent at 6/15/2022  9:40 PM EDT -----  Please call lab and get page 4 - LDL and FT3 missing. Please call patient and let her know that her thyroid labs show she is even more hyperthyroid, need to restart the Tapazole 5 mg daily (she had taken this [id number] with Dr. Bob Chavarria). Remind her she has been hyperthyroid and untreated for over 18 months. It is time to start the Tapazole. Repeat TSH and FT4 in 6 weeks. Recommend a referral to Dr. Bob Chavarria as well for discussion about definitive treatment.

## 2022-06-17 NOTE — TELEPHONE ENCOUNTER
I left a message for pt that she is more hyperthyroid and needs to go on Tapazole.   Script sent in and she needs to call next week to discuss

## 2022-06-22 ENCOUNTER — TELEPHONE (OUTPATIENT)
Dept: INTERNAL MEDICINE CLINIC | Age: 42
End: 2022-06-22

## 2022-06-22 NOTE — TELEPHONE ENCOUNTER
----- Message from Meryl Vazquez MD sent at 6/17/2022  2:45 PM EDT -----  Let her know when she calls back on Monday - LDL is high at 131 (goal <100) - would she consider a statin after she gets used to the Tapazole? Could try a different one as previous ones caused myalgia.

## 2022-06-27 DIAGNOSIS — E05.00 GRAVES DISEASE: ICD-10-CM

## 2022-06-27 DIAGNOSIS — E05.90 HYPERTHYROIDISM: Primary | ICD-10-CM

## 2022-07-13 NOTE — TELEPHONE ENCOUNTER
Spoke with patient and she is just back from vacation and she has  the Tapazole and will get it started . Labs ordered and mailed to patient . Patient agreeable to see Dr. Floyd Tello and referral placed . Discussed starting a statin once she is used to the Tapazole and patient will think about .

## 2022-10-10 ENCOUNTER — TELEPHONE (OUTPATIENT)
Dept: INTERNAL MEDICINE CLINIC | Age: 42
End: 2022-10-10

## 2022-10-10 NOTE — TELEPHONE ENCOUNTER
----- Message from Marcin Ann MD sent at 10/8/2022  4:42 PM EDT -----  Please set up visit to discuss elevated bilirubin and ask patient if she was fasting for her last hepatic panel 10/2022. If was fasting - need non-fasting hepatic. Non-fasting will be better in [place] disease.  ----- Message -----  From: Levy Wu MD  Sent: 10/8/2022   2:55 PM EDT  To: Marcin Ann MD    TSH remains suppressed but free T4 and free T3 are normal also, TSI is normal.  I suspect that she will continue to improve considering where the numbers well last year. No intervention at this point but keep existing appointment. Discuss bilirubin levels with PCP.

## 2022-10-10 NOTE — TELEPHONE ENCOUNTER
Patient called back stating the hepatic panel 9/26 was non-fasting and the hepatic panel 10/3 was fasting and both were abnormal . Patient needed to change appointment to Monday 10/17 . I scheduled her with Dr. Aldair Mullins at 2:20 .

## 2022-10-10 NOTE — TELEPHONE ENCOUNTER
Left message for patient that I schedule a follow-up with Dr. Cody Mcdaniel 10/18 at 11:30 AM to discuss her elevated bilirubin on recent lab work . Ask patient to call back and confirm appointment and need to know if she was fasting when she had her labs done .

## 2022-10-17 ENCOUNTER — OFFICE VISIT (OUTPATIENT)
Dept: INTERNAL MEDICINE CLINIC | Age: 42
End: 2022-10-17

## 2022-10-17 VITALS
HEART RATE: 62 BPM | HEIGHT: 71 IN | SYSTOLIC BLOOD PRESSURE: 122 MMHG | BODY MASS INDEX: 32.48 KG/M2 | WEIGHT: 232 LBS | TEMPERATURE: 97.6 F | DIASTOLIC BLOOD PRESSURE: 80 MMHG

## 2022-10-17 DIAGNOSIS — E05.90 HYPERTHYROIDISM: ICD-10-CM

## 2022-10-17 DIAGNOSIS — E80.6 UNCONJUGATED HYPERBILIRUBINEMIA: Primary | ICD-10-CM

## 2022-10-17 DIAGNOSIS — E10.9 TYPE 1 DIABETES MELLITUS WITHOUT COMPLICATION (HCC): ICD-10-CM

## 2022-10-17 LAB — HBA1C MFR BLD: 6.6 % (ref 4.3–5.7)

## 2022-10-17 NOTE — PROGRESS NOTES
1980    Chief Complaint   Patient presents with    Graves' Disease     Discuss lab results-abn liver labs       Pt is a 43 y.o. female with a history of DMT1 and untreated hyperthyroidism who presents for unconjugated hyperbilirubinemia on labs performed by endocrinology, Dr. Yadira Nagy. She follows regularly with Dr. Yadira Nagy, however is not currently on treatment for hyperthyroidism despite undetectable TSH, because her most recent T3 and T4 were in high-normal in September. Patient states that she was told to follow up with PCP for abnormal labs and is concerned about her liver function. Reports that she has recently completed several weeks of meloxicam for plantar fasciitis and wonders whether that could have affected her bilirubin levels. Denies any other concerns at this time and states that her DMT1 is well controlled with very rare episodes of hypoglycemia at this time with CGM and insulin pump. Denies weight changes, fatigue, headache, dizziness, vision changes, diaphoresis/unusual sweating, chest pain/pressure, palpitations, dyspnea, abdominal pain, changes in bowels/bladder, skin changes, rashes, lymphadenopathy, or edema. Past Medical History:   Diagnosis Date    Abnormal Pap smear     Diabetes mellitus ([facility] [address] 2012    type I    Graves disease     Hyperlipidemia     Infertility, female        Past Surgical History:   Procedure Laterality Date    ANTERIOR CRUCIATE LIGAMENT REPAIR  1996     SECTION      LEE  2006    WISDOM TOOTH EXTRACTION         Current Outpatient Medications   Medication Sig Dispense Refill    meloxicam (MOBIC) 15 MG tablet 15 mg      insulin aspart (NOVOLOG) 100 UNIT/ML injection vial Use for continuous infusion in insulin pump . Max dose 100 units daily . 30 mL 5     No current facility-administered medications for this visit.        Allergies   Allergen Reactions    Crestor [Rosuvastatin Calcium]      Mental fogginess    Lipitor [Atorvastatin Calcium] Constipation, stomach and leg cramps       Social History     Socioeconomic History    Marital status:      Spouse name: Not on file    Number of children: Not on file    Years of education: Not on file    Highest education level: Not on file   Occupational History    Occupation: Housewife   Tobacco Use    Smoking status: Former     Packs/day: 0.30     Years: 2.00     Pack years: 0.60     Types: Cigarettes     Quit date: 2002     Years since quittin.8    Smokeless tobacco: Never   Substance and Sexual Activity    Alcohol use:  Yes     Alcohol/week: 0.8 standard drinks     Types: 1 Standard drinks or equivalent per week     Comment: Occasionally    Drug use: No    Sexual activity: Yes     Partners: Male   Other Topics Concern    Not on file   Social History Narrative    ** Merged History Encounter **          Social Determinants of Health     Financial Resource Strain: Medium Risk    Difficulty of Paying Living Expenses: Somewhat hard   Food Insecurity: No Food Insecurity    Worried About Running Out of Food in the Last Year: Never true    Ran Out of Food in the Last Year: Never true   Transportation Needs: Not on file   Physical Activity: Not on file   Stress: Not on file   Social Connections: Not on file   Intimate Partner Violence: Not on file   Housing Stability: Not on file       Family History   Problem Relation Age of Onset    Heart Disease Mother 46        5 bypass CABBG    High Blood Pressure Mother         FMD    High Cholesterol Mother     Other Mother         Heart stent    Migraines Mother     Thyroid Disease Father         Hypothyroidism    Other Father         AAA    Stroke Maternal Grandmother 66    Diabetes Maternal Grandmother     Cancer Maternal Grandmother 79        Prostate    Hearing Loss Maternal Grandmother     Cancer Maternal Grandfather 80        Prostate Cancer    Miscarriages / Stillbirths Sister        Review of Systems - General ROS: negative for - chills or fever  Psychological ROS: negative for - anxiety and depression  Hematological and Lymphatic ROS: No history of blood clots or bleeding disorder. Respiratory ROS: no cough, shortness of breath, or wheezing  Cardiovascular ROS: no chest pain or dyspnea on exertion, tolerates a flight of stairs, vacuuming  Gastrointestinal ROS: no abdominal pain, change in bowel habits, or black or bloody stools  Genito-Urinary ROS: no dysuria, trouble voiding, or hematuria  Musculoskeletal ROS: negative for - muscle pain or muscular weakness  Neurological ROS: negative for - headaches, numbness/tingling, seizures or weakness  Dermatological ROS: negative for - rash or skin lesion changes    Blood pressure 122/80, pulse 62, temperature 97.6 °F (36.4 °C), height 5' 10.51\" (1.791 m), weight 232 lb (105.2 kg), not currently breastfeeding. Physical Examination: General appearance -alert, well appearing, and in no distress  Mental status - alert, oriented to person, place, and time  Head - atraumatic, normocephalic  Eyes - pupils equal and reactive to light, extraocular muscles intact  Ears - external ears are normal, hearing is grossly normal  Mouth - oral pharynx clear, mucous membranes moist  Neck - supple, no significant adenopathy, mild symmetric thyromegaly  Chest - clear to auscultation, no wheezes, rales or rhonchi, symmetric air entry  Heart - normal rate, regular rhythm, normal S1, S2, no murmurs, rubs, clicks or gallops  Abdomen -soft, nontender, nondistended  Neurological - alert, oriented, cranial nerves II-XII intact, motor and sensation are grossly intact bilateral upper and lower extremities. Extremities - peripheral pulses normal, no pedal edema, no clubbing or cyanosis  Skin - warm and dry    Diagnostic data:   I have reviewed recent diagnostic testing including labs with patient today. Assessment and Plan:     Diagnosis Orders   1. Unconjugated hyperbilirubinemia, isolated. Normal conjugated bilirubin.  CBC within reference ranges, no signs/symptoms of hemolysis, no known personal or family history of genetic disorders of bilirubin metabolism or liver disease, no signs/symptoms of biliary disease. Does have history of intermittent mildly elevated total bilirubin as far back as 2016. Most likely related to impaired bilirubin conjugation secondary to hyperthyroidism, however total bilirubin has also been normal at times and does not necessarily correlate to thyroid levels in all dates.  Non-fasting labs in 1 month: Hepatic Function Panel, Direct Bilirubin    Follow up in December as scheduled.    Advised against thyroid supplements containing thyroid hormone.          2. Hyperthyroidism  Continue to follow with Dr. Cottrell as scheduled      3. Type 1 diabetes mellitus without complication (HCC), stable. Managed with CGM and insulin pump. POCT HbA1c 6.6 today, 6.8 in Jun2022. Follow up in December as scheduled.          Staffed with: Dr. Ara Galeano.    [facility] INTER[facility]IC[facility]  [address] [place], [place] [id number]  Dept: [phone]  Dept Fax: [phone]  Loc: [phone]

## 2022-10-31 RX ORDER — INSULIN ASPART 100 [IU]/ML
INJECTION, SOLUTION INTRAVENOUS; SUBCUTANEOUS
Qty: 30 ML | Refills: 5 | Status: SHIPPED | OUTPATIENT
Start: 2022-10-31

## 2022-11-10 ENCOUNTER — HOSPITAL ENCOUNTER (EMERGENCY)
Age: 42
Discharge: HOME | End: 2022-11-10
Attending: EMERGENCY MEDICINE

## 2022-11-10 ENCOUNTER — TELEPHONE (OUTPATIENT)
Dept: INTERNAL MEDICINE CLINIC | Age: 42
End: 2022-11-10

## 2022-11-10 ENCOUNTER — APPOINTMENT (OUTPATIENT)
Dept: ULTRASOUND IMAGING | Age: 42
End: 2022-11-10

## 2022-11-10 ENCOUNTER — APPOINTMENT (OUTPATIENT)
Dept: CT IMAGING | Age: 42
End: 2022-11-10

## 2022-11-10 VITALS
HEART RATE: 58 BPM | HEIGHT: 70 IN | OXYGEN SATURATION: 99 % | TEMPERATURE: 98.7 F | DIASTOLIC BLOOD PRESSURE: 59 MMHG | WEIGHT: 220 LBS | RESPIRATION RATE: 16 BRPM | BODY MASS INDEX: 31.5 KG/M2 | SYSTOLIC BLOOD PRESSURE: 119 MMHG

## 2022-11-10 DIAGNOSIS — N10 ACUTE PYELONEPHRITIS: Primary | ICD-10-CM

## 2022-11-10 LAB
ALBUMIN SERPL-MCNC: 4 G/DL (ref 3.5–5.1)
ALP BLD-CCNC: 75 U/L (ref 38–126)
ALT SERPL-CCNC: 17 U/L (ref 11–66)
ANION GAP SERPL CALCULATED.3IONS-SCNC: 10 MEQ/L (ref 8–16)
AST SERPL-CCNC: 16 U/L (ref 5–40)
BACTERIA: ABNORMAL /HPF
BASOPHILS # BLD: 0.4 %
BASOPHILS ABSOLUTE: 0 THOU/MM3 (ref 0–0.1)
BILIRUB SERPL-MCNC: 1.2 MG/DL (ref 0.3–1.2)
BILIRUBIN URINE: NEGATIVE
BLOOD, URINE: NEGATIVE
BUN BLDV-MCNC: 9 MG/DL (ref 7–22)
CALCIUM SERPL-MCNC: 8.5 MG/DL (ref 8.5–10.5)
CASTS 2: ABNORMAL /LPF
CASTS UA: ABNORMAL /LPF
CHARACTER, URINE: CLEAR
CHLORIDE BLD-SCNC: 103 MEQ/L (ref 98–111)
CO2: 25 MEQ/L (ref 23–33)
COLOR: YELLOW
CREAT SERPL-MCNC: 0.8 MG/DL (ref 0.4–1.2)
CRYSTALS, UA: ABNORMAL
EOSINOPHIL # BLD: 0.8 %
EOSINOPHILS ABSOLUTE: 0.1 THOU/MM3 (ref 0–0.4)
EPITHELIAL CELLS, UA: ABNORMAL /HPF
ERYTHROCYTE [DISTWIDTH] IN BLOOD BY AUTOMATED COUNT: 12.3 % (ref 11.5–14.5)
ERYTHROCYTE [DISTWIDTH] IN BLOOD BY AUTOMATED COUNT: 37.7 FL (ref 35–45)
GFR SERPL CREATININE-BSD FRML MDRD: > 60 ML/MIN/1.73M2
GLUCOSE BLD-MCNC: 189 MG/DL (ref 70–108)
GLUCOSE URINE: NEGATIVE MG/DL
HCT VFR BLD CALC: 39.4 % (ref 37–47)
HEMOGLOBIN: 13.3 GM/DL (ref 12–16)
IMMATURE GRANS (ABS): 0.01 THOU/MM3 (ref 0–0.07)
IMMATURE GRANULOCYTES: 0.1 %
KETONES, URINE: NEGATIVE
LEUKOCYTE ESTERASE, URINE: ABNORMAL
LIPASE: 20 U/L (ref 5.6–51.3)
LYMPHOCYTES # BLD: 18.8 %
LYMPHOCYTES ABSOLUTE: 1.3 THOU/MM3 (ref 1–4.8)
MCH RBC QN AUTO: 28.3 PG (ref 26–33)
MCHC RBC AUTO-ENTMCNC: 33.8 GM/DL (ref 32.2–35.5)
MCV RBC AUTO: 83.8 FL (ref 81–99)
MISCELLANEOUS 2: ABNORMAL
MONOCYTES # BLD: 8.6 %
MONOCYTES ABSOLUTE: 0.6 THOU/MM3 (ref 0.4–1.3)
NITRITE, URINE: NEGATIVE
NUCLEATED RED BLOOD CELLS: 0 /100 WBC
OSMOLALITY CALCULATION: 279.4 MOSMOL/KG (ref 275–300)
PH UA: 6.5 (ref 5–9)
PLATELET # BLD: 164 THOU/MM3 (ref 130–400)
PMV BLD AUTO: 10.2 FL (ref 9.4–12.4)
POTASSIUM REFLEX MAGNESIUM: 3.8 MEQ/L (ref 3.5–5.2)
PREGNANCY, SERUM: NEGATIVE
PROTEIN UA: NEGATIVE
RBC # BLD: 4.7 MILL/MM3 (ref 4.2–5.4)
RBC URINE: ABNORMAL /HPF
RENAL EPITHELIAL, UA: ABNORMAL
SEG NEUTROPHILS: 71.3 %
SEGMENTED NEUTROPHILS ABSOLUTE COUNT: 5.1 THOU/MM3 (ref 1.8–7.7)
SODIUM BLD-SCNC: 138 MEQ/L (ref 135–145)
SPECIFIC GRAVITY, URINE: 1.01 (ref 1–1.03)
TOTAL PROTEIN: 6.2 G/DL (ref 6.1–8)
UROBILINOGEN, URINE: 0.2 EU/DL (ref 0–1)
WBC # BLD: 7.1 THOU/MM3 (ref 4.8–10.8)
WBC UA: ABNORMAL /HPF
YEAST: ABNORMAL

## 2022-11-10 PROCEDURE — 83690 ASSAY OF LIPASE: CPT

## 2022-11-10 PROCEDURE — 85025 COMPLETE CBC W/AUTO DIFF WBC: CPT

## 2022-11-10 PROCEDURE — 80053 COMPREHEN METABOLIC PANEL: CPT

## 2022-11-10 PROCEDURE — 99285 EMERGENCY DEPT VISIT HI MDM: CPT

## 2022-11-10 PROCEDURE — 6360000002 HC RX W HCPCS: Performed by: STUDENT IN AN ORGANIZED HEALTH CARE EDUCATION/TRAINING PROGRAM

## 2022-11-10 PROCEDURE — 81001 URINALYSIS AUTO W/SCOPE: CPT

## 2022-11-10 PROCEDURE — 36415 COLL VENOUS BLD VENIPUNCTURE: CPT

## 2022-11-10 PROCEDURE — 6360000004 HC RX CONTRAST MEDICATION: Performed by: EMERGENCY MEDICINE

## 2022-11-10 PROCEDURE — 87186 SC STD MICRODIL/AGAR DIL: CPT

## 2022-11-10 PROCEDURE — 96375 TX/PRO/DX INJ NEW DRUG ADDON: CPT

## 2022-11-10 PROCEDURE — 84703 CHORIONIC GONADOTROPIN ASSAY: CPT

## 2022-11-10 PROCEDURE — 76705 ECHO EXAM OF ABDOMEN: CPT

## 2022-11-10 PROCEDURE — 96365 THER/PROPH/DIAG IV INF INIT: CPT

## 2022-11-10 PROCEDURE — 74177 CT ABD & PELVIS W/CONTRAST: CPT

## 2022-11-10 PROCEDURE — 2580000003 HC RX 258: Performed by: STUDENT IN AN ORGANIZED HEALTH CARE EDUCATION/TRAINING PROGRAM

## 2022-11-10 PROCEDURE — 6370000000 HC RX 637 (ALT 250 FOR IP): Performed by: STUDENT IN AN ORGANIZED HEALTH CARE EDUCATION/TRAINING PROGRAM

## 2022-11-10 PROCEDURE — 87077 CULTURE AEROBIC IDENTIFY: CPT

## 2022-11-10 PROCEDURE — 87086 URINE CULTURE/COLONY COUNT: CPT

## 2022-11-10 RX ORDER — ONDANSETRON 4 MG/1
4 TABLET, ORALLY DISINTEGRATING ORAL 3 TIMES DAILY PRN
Qty: 21 TABLET | Refills: 0 | Status: SHIPPED | OUTPATIENT
Start: 2022-11-10

## 2022-11-10 RX ORDER — ONDANSETRON 2 MG/ML
4 INJECTION INTRAMUSCULAR; INTRAVENOUS
Status: DISCONTINUED | OUTPATIENT
Start: 2022-11-10 | End: 2022-11-11 | Stop reason: HOSPADM

## 2022-11-10 RX ORDER — KETOROLAC TROMETHAMINE 30 MG/ML
15 INJECTION, SOLUTION INTRAMUSCULAR; INTRAVENOUS ONCE
Status: COMPLETED | OUTPATIENT
Start: 2022-11-10 | End: 2022-11-10

## 2022-11-10 RX ORDER — CEFPODOXIME PROXETIL 200 MG/1
200 TABLET, FILM COATED ORAL 2 TIMES DAILY
Qty: 28 TABLET | Refills: 0 | Status: SHIPPED | OUTPATIENT
Start: 2022-11-11 | End: 2022-11-11 | Stop reason: ALTCHOICE

## 2022-11-10 RX ORDER — SODIUM CHLORIDE, SODIUM LACTATE, POTASSIUM CHLORIDE, AND CALCIUM CHLORIDE .6; .31; .03; .02 G/100ML; G/100ML; G/100ML; G/100ML
1000 INJECTION, SOLUTION INTRAVENOUS ONCE
Status: COMPLETED | OUTPATIENT
Start: 2022-11-10 | End: 2022-11-10

## 2022-11-10 RX ORDER — ACETAMINOPHEN 500 MG
1000 TABLET ORAL ONCE
Status: COMPLETED | OUTPATIENT
Start: 2022-11-10 | End: 2022-11-10

## 2022-11-10 RX ADMIN — CEFTRIAXONE SODIUM 1000 MG: 1 INJECTION, POWDER, FOR SOLUTION INTRAMUSCULAR; INTRAVENOUS at 21:34

## 2022-11-10 RX ADMIN — IOPAMIDOL 80 ML: 755 INJECTION, SOLUTION INTRAVENOUS at 19:46

## 2022-11-10 RX ADMIN — ACETAMINOPHEN 1000 MG: 500 TABLET ORAL at 17:30

## 2022-11-10 RX ADMIN — KETOROLAC TROMETHAMINE 15 MG: 30 INJECTION, SOLUTION INTRAMUSCULAR at 21:33

## 2022-11-10 RX ADMIN — SODIUM CHLORIDE, POTASSIUM CHLORIDE, SODIUM LACTATE AND CALCIUM CHLORIDE 1000 ML: 600; 310; 30; 20 INJECTION, SOLUTION INTRAVENOUS at 17:31

## 2022-11-10 ASSESSMENT — PAIN DESCRIPTION - ORIENTATION
ORIENTATION: RIGHT

## 2022-11-10 ASSESSMENT — ENCOUNTER SYMPTOMS
COLOR CHANGE: 0
SORE THROAT: 0
SHORTNESS OF BREATH: 0
BACK PAIN: 0
NAUSEA: 1
CONSTIPATION: 0
ABDOMINAL PAIN: 1
VOMITING: 0
CHEST TIGHTNESS: 0
ABDOMINAL DISTENTION: 0
COUGH: 0
WHEEZING: 0
DIARRHEA: 0
RHINORRHEA: 0

## 2022-11-10 ASSESSMENT — PAIN DESCRIPTION - ONSET
ONSET: ON-GOING
ONSET: ON-GOING

## 2022-11-10 ASSESSMENT — PAIN - FUNCTIONAL ASSESSMENT
PAIN_FUNCTIONAL_ASSESSMENT: 0-10

## 2022-11-10 ASSESSMENT — PAIN DESCRIPTION - LOCATION
LOCATION: ABDOMEN;BACK
LOCATION: ABDOMEN
LOCATION: ABDOMEN
LOCATION: ABDOMEN;BACK

## 2022-11-10 ASSESSMENT — PAIN DESCRIPTION - PAIN TYPE
TYPE: ACUTE PAIN

## 2022-11-10 ASSESSMENT — PAIN SCALES - GENERAL
PAINLEVEL_OUTOF10: 3
PAINLEVEL_OUTOF10: 3
PAINLEVEL_OUTOF10: 8
PAINLEVEL_OUTOF10: 3
PAINLEVEL_OUTOF10: 8
PAINLEVEL_OUTOF10: 3

## 2022-11-10 ASSESSMENT — PAIN DESCRIPTION - FREQUENCY
FREQUENCY: INTERMITTENT
FREQUENCY: CONTINUOUS
FREQUENCY: CONTINUOUS

## 2022-11-10 NOTE — TELEPHONE ENCOUNTER
Patient called stating that she is having abdominal pain on her right side under her rib cage ,. Patient states that at her appointment she was not having and symptoms from the elevated bilirubin . Patient is also having some nausea and headache . Patient was asking if a scan could be ordered or what Dr. Nitish Ventura recommends . Spoke with Dr. Nitish Ventura and we will need to see patient to examine for further testing . If she feels pain is significant and cannot wait go to ER. Spoke with patient and offered appointment for Monday or if pain is bad could go to ER . Patient is going to go to ER as the pain and nausea was bad enough thar she had to leave work .

## 2022-11-10 NOTE — ED PROVIDER NOTES
[address] [address]          Pt Name: Елена Quinonez  MRN: [id number]  [place] 1980  Date of Evaluation: 11/10/2022  Treating Resident Physician: Wili Noe MD  Supervising Physician: Edgard Escobar MD    [address] [place] [address]       Chief Complaint   Patient presents with    Abdominal Pain    Back Pain     History obtained from chart review and the patient. HISTORY OF PRESENT ILLNESS    HPI    Елена Quinonez is a 43 y.o. female with a past medical history significant for diabetes mellitus, hypertension, hypothyroidism, hyperlipidemia , presents to the emergency department for evaluation of nausea, abdominal pain, headache. Patient 1 episode of similar abdominal pain couple weeks ago. Ate pizza last night, when she got up to go to the bathroom around 4 AM started having abdominal pain, nausea. The pain is gotten worse throughout the day. She has been able to eat without any vomiting. Also notes a headache that is getting worse. She talked to her primary care office or sent her in for further evaluation. She also has had a recent elevated bilirubin level. The patient has no other acute complaints at this time. REVIEW OF SYSTEMS   Review of Systems   Constitutional:  Positive for appetite change. Negative for activity change, chills, diaphoresis, fatigue, fever and unexpected weight change. HENT:  Negative for congestion, nosebleeds, rhinorrhea, sneezing and sore throat. Respiratory:  Negative for cough, chest tightness, shortness of breath and wheezing. Cardiovascular:  Negative for chest pain, palpitations and leg swelling. Gastrointestinal:  Positive for abdominal pain and nausea. Negative for abdominal distention, constipation, diarrhea and vomiting. Genitourinary:  Positive for flank pain. Negative for difficulty urinating, dysuria, frequency, hematuria, urgency, vaginal bleeding and vaginal discharge. Musculoskeletal:  Negative for back pain and neck pain. Skin:  Negative for color change, pallor, rash and wound. Allergic/Immunologic: Negative for environmental allergies and food allergies. Neurological:  Positive for headaches. Negative for dizziness, syncope, weakness and numbness. Hematological:  Negative for adenopathy. Does not bruise/bleed easily. Psychiatric/Behavioral:  Negative for agitation and confusion. All other systems reviewed and are negative. PAST MEDICAL AND SURGICAL HISTORY     Past Medical History:   Diagnosis Date    Abnormal Pap smear     Diabetes mellitus ([facility] [address] 2012    type I    Graves disease     Hyperlipidemia     Infertility, female      Past Surgical History:   Procedure Laterality Date    ANTERIOR CRUCIATE LIGAMENT REPAIR       SECTION      [facility]  2006    WISDOM TOOTH EXTRACTION           MEDICATIONS     Current Facility-Administered Medications:     ondansetron (ZOFRAN) injection 4 mg, 4 mg, IntraVENous, Once PRN, Dante Mcmullen MD    cefTRIAXone (ROCEPHIN) 1,000 mg in dextrose 5 % 50 mL IVPB mini-bag, 1,000 mg, IntraVENous, Once, Dante Mcmullen MD, Last Rate: 100 mL/hr at 11/10/22 2134, 1,000 mg at 11/10/22 2134    Current Outpatient Medications:     [START ON 2022] cefpodoxime (VANTIN) 200 MG tablet, Take 1 tablet by mouth 2 times daily for 14 days, Disp: 28 tablet, Rfl: 0    ondansetron (ZOFRAN-ODT) 4 MG disintegrating tablet, Take 1 tablet by mouth 3 times daily as needed for Nausea or Vomiting, Disp: 21 tablet, Rfl: 0    insulin aspart (NOVOLOG) 100 UNIT/ML injection vial, Use for continuous infusion in insulin pump. Max dose 100 units daily . , Disp: 30 mL, Rfl: 5    meloxicam (MOBIC) 15 MG tablet, 15 mg, Disp: , Rfl:     insulin aspart (NOVOLOG) 100 UNIT/ML injection vial, Use for continuous infusion in insulin pump . Max dose 100 units daily . , Disp: 30 mL, Rfl: 5      SOCIAL HISTORY     Social History     Social History Narrative    ** Merged History Encounter **          Social History     Tobacco Use    Smoking status: Former     Packs/day: 0.30     Years: 2.00     Pack years: 0.60     Types: Cigarettes     Quit date: 2002     Years since quittin.8    Smokeless tobacco: Never   Substance Use Topics    Alcohol use: Yes     Alcohol/week: 0.8 standard drinks     Types: 1 Standard drinks or equivalent per week     Comment: Occasionally    Drug use: No         ALLERGIES     Allergies   Allergen Reactions    Crestor [Rosuvastatin Calcium]      Mental fogginess    Lipitor [Atorvastatin Calcium]      Constipation, stomach and leg cramps         FAMILY HISTORY     Family History   Problem Relation Age of Onset    Heart Disease Mother 46        5 bypass CABBG    High Blood Pressure Mother         FMD    High Cholesterol Mother     Other Mother         Heart stent    Migraines Mother     Thyroid Disease Father         Hypothyroidism    Other Father         AAA    Stroke Maternal Grandmother 66    Diabetes Maternal Grandmother     Cancer Maternal Grandmother 79        Prostate    Hearing Loss Maternal Grandmother     Cancer Maternal Grandfather 80        Prostate Cancer    Miscarriages / Stillbirths Sister          PREVIOUS RECORDS   Previous records reviewed:  Notes from the primary care office reviewed . PHYSICAL EXAM     ED Triage Vitals [11/10/22 1640]   BP Temp Temp Source Heart Rate Resp SpO2 Height Weight   128/66 98.7 °F (37.1 °C) Oral 74 16 98 % 5' 10\" (1.778 m) 220 lb (99.8 kg)     Initial vital signs and nursing assessment reviewed and normal. Body mass index is 31.57 kg/m². Pulsoximetry is normal per my interpretation.     Additional Vital Signs:  Vitals:    11/10/22 2131   BP: (!) 119/59   Pulse: 58   Resp: 16   Temp:    SpO2: 99%       Physical Exam        MEDICAL DECISION MAKING   Initial Differential Diagnosis: (includes but is not limited to)  Biliary colic  Acute cholecystitis  Appendicitis  Pyelonephritis  Renal colic. Pancreatitis  Urinary tract infection    Plan:   IV  CBC, BMP, LFT, Lipase, HCG  Urinalysis  Bedside Ultrasound  RUQ Formal Ultrasound  IV fluid bolus  Ondansetron  acetaminophen    Summary:  Bedside ultrasound did not show any definitive pathology in the gallbladder, formal right upper quadrant ultrasound was negative, though patient did have pain with exam of the right kidney. A CT scan her abdomen pelvis showed inflammation around the right kidney consistent with pyelonephritis. Urinalysis positive for infection. Patient symptoms were slightly better after symptomatic treatment with acetaminophen, IV fluid bolus, patient declined antiemetics or additional analgesia. Should be given a dose of ceftriaxone IV as well as ketorolac prior to discharge. Prescription sent for antibiotics for the next 14 days. Advised patient to follow-up with her primary care physician within the next few days to ensure improvement in her symptoms. Reviewed return precautions prior to discharge. ED RESULTS   Laboratory results:  Labs Reviewed   COMPREHENSIVE METABOLIC PANEL W/ REFLEX TO MG FOR LOW K - Abnormal; Notable for the following components:       Result Value    Glucose 189 (*)     All other components within normal limits   URINE WITH REFLEXED MICRO - Abnormal; Notable for the following components:    Leukocyte Esterase, Urine SMALL (*)     All other components within normal limits   CULTURE, REFLEXED, URINE   CBC WITH AUTO DIFFERENTIAL   LIPASE   GLOMERULAR FILTRATION RATE, ESTIMATED   HCG, SERUM, QUALITATIVE   ANION GAP   OSMOLALITY       Radiologic studies results:  CT ABDOMEN PELVIS W IV CONTRAST Additional Contrast? None   Final Result   1. Mild inflammatory changes in the fat adjacent to the superior pole of    the right kidney. While nonspecific this can be associated with    pyelonephritis. No evidence of renal obstruction.    2. No evidence of appendicitis. 3. Dominant left ovarian cyst/follicle measuring up to 6.1 cm. Recommend    pelvic ultrasound for further characterization. 4. Hepatosplenomegaly. This document has been electronically signed by: Mason Shipley MD on    11/10/2022 08:59 PM      All CTs at this facility use dose modulation techniques and iterative    reconstructions, and/or weight-based dosing   when appropriate to reduce radiation to a low as reasonably achievable. US GALLBLADDER RUQ   Final Result   1. Evaluation of the right kidney is limited secondary to patient    discomfort when evaluating. Question mild right hydronephrosis. 2. No evidence of cholecystitis. Gallbladder is contracted. This document has been electronically signed by: Mason Shipley MD on    11/10/2022 06:29 PM          ED Medications administered this visit:   Medications   ondansetron [facility]) injection 4 mg (has no administration in time range)   cefTRIAXone (ROCEPHIN) 1,000 mg in dextrose 5 % 50 mL IVPB mini-bag (1,000 mg IntraVENous New Bag 11/10/22 2134)   lactated ringers bolus (0 mLs IntraVENous Stopped 11/10/22 1801)   acetaminophen (TYLENOL) tablet 1,000 mg (1,000 mg Oral Given 11/10/22 1730)   iopamidol (ISOVUE-370) 76 % injection 80 mL (80 mLs IntraVENous Given 11/10/22 1946)   ketorolac (TORADOL) injection 15 mg (15 mg IntraVENous Given 11/10/22 2133)         ED COURSE          Strict return precautions and follow up instructions were discussed with the patient prior to discharge, with which the patient agrees.     PROCEDURES   Procedures      MEDICATION CHANGES     New Prescriptions    CEFPODOXIME (VANTIN) 200 MG TABLET    Take 1 tablet by mouth 2 times daily for 14 days    ONDANSETRON (ZOFRAN-ODT) 4 MG DISINTEGRATING TABLET    Take 1 tablet by mouth 3 times daily as needed for Nausea or Vomiting         FINAL DISPOSITION     Final diagnoses:   Acute pyelonephritis       Condition: condition: fair  Dispo: Discharge to home      This transcription was electronically signed. Parts of this transcriptions may have been dictated by use of voice recognition software and electronically transcribed, and parts may have been transcribed with the assistance of an ED scribe. The transcription may contain errors not detected in proofreading. Please refer to my supervising physician's documentation if my documentation differs.     Electronically Signed: Rusty Mcnair MD, 11/10/22, 10:04 PM         Marques Arnold MD  Resident  11/10/22 1905

## 2022-11-10 NOTE — ED NOTES
Pt back from [facility]. VSS.  Call light in reach     [address] [facility]  11/10/22 4772

## 2022-11-10 NOTE — ED NOTES
RN medicated pt per [facility] at this time. Pt is resting in cot with respirations even and unlabored. No concern or need is voiced. Call light is within reach.       Alonzo Landin RN  11/10/22 1997

## 2022-11-11 ENCOUNTER — PATIENT MESSAGE (OUTPATIENT)
Dept: INTERNAL MEDICINE CLINIC | Age: 42
End: 2022-11-11

## 2022-11-11 LAB
ORGANISM: ABNORMAL
URINE CULTURE REFLEX: ABNORMAL

## 2022-11-11 RX ORDER — CEFUROXIME AXETIL 500 MG/1
500 TABLET ORAL 2 TIMES DAILY
Qty: 20 TABLET | Refills: 0 | Status: SHIPPED | OUTPATIENT
Start: 2022-11-11 | End: 2022-11-21

## 2022-11-11 NOTE — ED NOTES
Pt vitals collected. Pt denies any needs at this time. Pt respirations easy and unlabored.      Florencia Lee RN  11/10/22 1926

## 2022-11-11 NOTE — DISCHARGE INSTRUCTIONS
Seen today for abdominal pain, you are found to have a pyelonephritis which is an infection in your right kidney. Take the antibiotics as prescribed for the next 14 days. Follow-up with your primary care provider in the next 1 to 2 days to ensure that your symptoms are getting better. If your symptoms are getting worse, fever, not able to tolerate oral intake, please come back to the emergency department for reevaluation.

## 2022-11-11 NOTE — ED NOTES
Pt vitals collected. Pt denies any needs at this time. Pt respirations easy and unlabored.      Claudio Escobar RN  11/10/22 2035

## 2022-11-11 NOTE — ED NOTES
Provider at bedside updating pt on plan of care. Voiced no needs. Call light in reach.      Doris Moreira RN  11/10/22 2002

## 2022-11-12 NOTE — PROGRESS NOTES
Pharmacy Note  ED Culture Follow-up    Kin Hill is a 43 y.o. female. Allergies: Crestor [rosuvastatin calcium] and Lipitor [atorvastatin calcium]     Current antimicrobials:   Reviewed patient's urine culture - culture positive for E. coli. Patient was discharged on cefuroxime, and culture is sensitive to prescribed medication. Antibiotic prescribed at discharge is appropriate - no changes made to antibiotic regimen. Please call with any questions.  Ext. [phone]    Kenrick He [facility], PharmD 2:22 PM 11/12/2022

## 2022-11-15 NOTE — TELEPHONE ENCOUNTER
Spoke with patient and the pain is subsiding on the antibiotics and she is feeling better . Instructed patient to make sure she finished the antibiotic and call the office if she symptoms do not completely resolve . Patient verbalized understanding .

## 2022-11-15 NOTE — TELEPHONE ENCOUNTER
Call her back and find out how she is doing now that she has gotten several days of antibiotic over the weekend. May need to set up with Urology.

## 2022-12-19 ENCOUNTER — OFFICE VISIT (OUTPATIENT)
Dept: INTERNAL MEDICINE CLINIC | Age: 42
End: 2022-12-19

## 2022-12-19 VITALS
BODY MASS INDEX: 33.27 KG/M2 | DIASTOLIC BLOOD PRESSURE: 80 MMHG | WEIGHT: 232.4 LBS | HEIGHT: 70 IN | TEMPERATURE: 97.5 F | HEART RATE: 68 BPM | SYSTOLIC BLOOD PRESSURE: 126 MMHG

## 2022-12-19 DIAGNOSIS — E10.9 TYPE 1 DIABETES MELLITUS WITHOUT COMPLICATION (HCC): Primary | ICD-10-CM

## 2022-12-19 DIAGNOSIS — Z12.4 CERVICAL CANCER SCREENING: ICD-10-CM

## 2022-12-19 DIAGNOSIS — E05.00 GRAVES DISEASE: ICD-10-CM

## 2022-12-19 DIAGNOSIS — E78.00 PURE HYPERCHOLESTEROLEMIA: ICD-10-CM

## 2022-12-19 DIAGNOSIS — R16.2 HEPATOSPLENOMEGALY: ICD-10-CM

## 2022-12-19 DIAGNOSIS — E80.6 UNCONJUGATED HYPERBILIRUBINEMIA: ICD-10-CM

## 2022-12-19 DIAGNOSIS — N83.209 CYST OF OVARY, UNSPECIFIED LATERALITY: ICD-10-CM

## 2022-12-19 NOTE — PATIENT INSTRUCTIONS
Follow up for labs ordered at previous visits with this clinic and Dr. Vassie Holstein. Continue to follow podiatry as scheduled. We have placed a referral for OB/Gyn for cervical cancer screening and follow up for incidental finding of ovarian cyst on CT Abd/Pelvis in [id number]. We strongly recommend restarting a statin given your personal history of diabetes and family history of coronary artery disease. It would be best to keep your LDL under 100. Your LDL 2 months ago was 125. We strongly encourage you to restart lisinopril.

## 2022-12-19 NOTE — PROGRESS NOTES
1980    Chief Complaint   Patient presents with    Diabetes     Last A1c 6.6 on 10/17 / ER 11/13 -acute pyelonephritis     Other     Hyperbilirubinemia        Pt is a 42 y.o. female with a history of DMT1 and untreated hyperthyroidism who presents for 6 month follow up. PCP is Dr. Malcolm. She sees endocrinology, Dr. Cottrell for management of her hyperthyroidism and last saw him 14NOV2022. At that time, Dr. Cottrell stated that her Grave's disease appears to be in remission and will require surveillance. She also follows with DM clinic to also assist with pump management. She was seen in this clinic in October for unconjugated hyperbilirubinemia. She has not been able to get to complete labs ordered at that visit or most recent visit with Dr. Cottrell. CMP done for ED visit last month for acute pyelonephritis showed total bilirubin within reference range. CT Abd/Pelvis at that time also showed hepatosplenomegaly without any discrete lesions. LDL in SEP2022 125, not currently on statin and has family history of CAD. She brings blood glucose data to this appointment which notes some scattered episodes of hypoglycemia. She denies any episodes of symptomatic hypoglycemia and HbA1c was 6.6 on 17OCT2022, stable from previous. She denies concerns with her monitoring or insulin at this time. She has been following with podiatry consistently and saw her podiatrist this morning for ongoing right heel/achilles tendon pain and numbness in her right toes with prolonged dorsiflextion. No other foot concerns from her or her podiatrist and she will be following for MRI and nerve testing in her right foot. Last diabetic foot exam performed 13JUN2022 without concerning findings. Is due for eye exam, last eye exam 22JUL2022 without DM retinopathy. At this time, her only complaint is ongoing right foot pain and swelling. She denies headache, vision changes, dizziness, n/v, URI symptoms, chest pain, dyspnea, abdominal pain, diarrhea,  constipation, urinary urgency/frequency, dysuria, back pain, skin changes, lymphadenopathy, or new edema. Past Medical History:   Diagnosis Date    Abnormal Pap smear     Diabetes mellitus ([facility] [address] 2012    type I    Graves disease     Hyperlipidemia     Infertility, female        Past Surgical History:   Procedure Laterality Date    ANTERIOR CRUCIATE LIGAMENT REPAIR  1996     SECTION      [facility]      WISDOM TOOTH EXTRACTION         Current Outpatient Medications   Medication Sig Dispense Refill    insulin aspart (NOVOLOG) 100 UNIT/ML injection vial Use for continuous infusion in insulin pump. Max dose 100 units daily . 30 mL 5     No current facility-administered medications for this visit. Allergies   Allergen Reactions    Crestor [Rosuvastatin Calcium]      Mental fogginess    Lipitor [Atorvastatin Calcium]      Constipation, stomach and leg cramps       Social History     Socioeconomic History    Marital status:      Spouse name: Not on file    Number of children: Not on file    Years of education: Not on file    Highest education level: Not on file   Occupational History    Occupation: Housewife   Tobacco Use    Smoking status: Former     Packs/day: 0.30     Years: 2.00     Pack years: 0.60     Types: Cigarettes     Quit date: 2002     Years since quittin.9    Smokeless tobacco: Never   Substance and Sexual Activity    Alcohol use:  Yes     Alcohol/week: 0.8 standard drinks     Types: 1 Standard drinks or equivalent per week     Comment: Occasionally    Drug use: No    Sexual activity: Yes     Partners: Male   Other Topics Concern    Not on file   Social History Narrative    ** Merged History Encounter **          Social Determinants of Health     Financial Resource Strain: Medium Risk    Difficulty of Paying Living Expenses: Somewhat hard   Food Insecurity: No Food Insecurity    Worried About Running Out of Food in the Last Year: Never true    [employer] in the Last Year: Never true   Transportation Needs: Not on file   Physical Activity: Not on file   Stress: Not on file   Social Connections: Not on file   Intimate Partner Violence: Not on file   Housing Stability: Not on file       Family History   Problem Relation Age of Onset    Heart Disease Mother 46        5 bypass CABBG    High Blood Pressure Mother         FMD    High Cholesterol Mother     Other Mother         Heart stent    Migraines Mother     Thyroid Disease Father         Hypothyroidism    Other Father         AAA    Stroke Maternal Grandmother 66    Diabetes Maternal Grandmother     Cancer Maternal Grandmother 79        Prostate    Hearing Loss Maternal Grandmother     Cancer Maternal Grandfather 80        Prostate Cancer    Miscarriages / Stillbirths Sister        Review of Systems - General ROS: negative for - chills or fever  Psychological ROS: negative for - stress  Hematological and Lymphatic ROS: No history of blood clots or bleeding disorder. Respiratory ROS: no cough, shortness of breath, or wheezing  Cardiovascular ROS: no chest pain or dyspnea on exertion, tolerates a flight of stairs, vacuuming  Gastrointestinal ROS: no abdominal pain, change in bowel habits, or black or bloody stools  Genito-Urinary ROS: no dysuria, trouble voiding, or hematuria  Musculoskeletal ROS: negative for - muscle pain or muscular weakness, positive for right foot pain/swelling. Neurological ROS: negative for - headaches, numbness/tingling, seizures or weakness  Dermatological ROS: negative for - rash or skin lesion changes    Blood pressure 126/80, pulse 68, temperature 97.5 °F (36.4 °C), height 5' 10\" (1.778 m), weight 232 lb 6.4 oz (105.4 kg), not currently breastfeeding. Physical Examination:  General: overweight  female,   Eyes:  Nonicteric, no conjunctivitis, EOMs intact. HENT: Normocephalic, atraumatic. Nares normal. Oral mucosa moist. Hearing intact.    Neck: Supple, with full range of motion. No gross JVD appreciated. Respiratory:  Normal effort. Clear to auscultation, without rales or wheezes or rhonchi. Cardiovascular: RRR, good S1/S2, rubs, gallops, or murmurs. No lower extremity edema. Abdomen: Soft, non-tender, non-distended. Bowel sounds present. Musculoskeletal: Mild right foot swelling or tenderness. Normal tone. No abnormal movements. Skin: Warm and dry. No rashes or lesions. Neurologic:  No focal sensory/motor deficits in the upper or lower extremities. Est[facility]: Alert and oriented, normal insight and thought content. Capillary Refill: Brisk,< 3 seconds. Peripheral Pulses: +2 palpable, equal bilaterally. Diagnostic data:   I have reviewed recent diagnostic testing including labs with patient today. Assessment and Plan:     ASSESSMENT PLAN   1. Type 1 diabetes mellitus without complication ([facility] [address] Most recent HbA1c 6.6 in OCT2022, stable from previous. Few episodes of hypoglycemia, no episodes of symptomatic hypoglycemia. No concerns at this time. Continue CGM and insulin pump  Please get your yearly eye exam, due July 2022  We strongly recommend restarting a statin given your personal history of diabetes and family history of coronary artery disease. Return to clinic in 6 months, sooner with any new questions or concerns      2. Graves disease. Follows with Dr. Orin Park. Most recent note stats that Graves disease is most likely in remission. Follow up for labs scheduled by Dr. Orin Park. Continue to follow with Dr. Orin Park as scheduled, sooner if you develop any signs or symptoms of hyperthyroidism including heat intolerance, excessive sweating, unintentional weight loss, weakness, palpitations, tremor, or unexplained anxiety. 3. Unconjugated hyperbilirubinemia. Most recent CMP showed normal total bilirubin, however no total bilirubin measured at that time. Follow up for labs scheduled at last appointment in October.        4. Pure hypercholesterolemia  We strongly recommend restarting a statin given your personal history of diabetes and family history of coronary artery disease. 5. Cervical cancer screening; Cyst of ovary, incidental finding. Referral made to Raheem Vega MD, Obstetrics & Gynecology, [facility]  Please follow up as soon as possible for cervical cancer screening. 6. Hepatosplenomegaly, incidental finding. Please follow up with previously scheduled labs from last appointment as this clinic and from Dr. Bob Chavarria. Will continue to monitor liver function tests. Please abstain from alcohol to ensure optimal liver health. Please abstain from taking more than 3g tylenol daily. Staffed with: Dr. Houston Jewell. Anabel Mack  INTERNAL MEDICINE  [address]  Dept: [phone]  Dept Fax: 56 : [phone]

## 2022-12-20 ENCOUNTER — NURSE ONLY (OUTPATIENT)
Dept: LAB | Age: 42
End: 2022-12-20

## 2022-12-20 ENCOUNTER — OFFICE VISIT (OUTPATIENT)
Dept: INTERNAL MEDICINE CLINIC | Age: 42
End: 2022-12-20
Payer: COMMERCIAL

## 2022-12-20 ENCOUNTER — TELEPHONE (OUTPATIENT)
Dept: INTERNAL MEDICINE CLINIC | Age: 42
End: 2022-12-20

## 2022-12-20 VITALS
DIASTOLIC BLOOD PRESSURE: 84 MMHG | SYSTOLIC BLOOD PRESSURE: 120 MMHG | WEIGHT: 229.6 LBS | BODY MASS INDEX: 32.87 KG/M2 | HEIGHT: 70 IN | TEMPERATURE: 97.2 F | HEART RATE: 64 BPM

## 2022-12-20 DIAGNOSIS — E10.9 TYPE 1 DIABETES MELLITUS WITHOUT COMPLICATION (HCC): Primary | ICD-10-CM

## 2022-12-20 DIAGNOSIS — E78.00 PURE HYPERCHOLESTEROLEMIA: ICD-10-CM

## 2022-12-20 DIAGNOSIS — E80.6 UNCONJUGATED HYPERBILIRUBINEMIA: ICD-10-CM

## 2022-12-20 DIAGNOSIS — E10.9 TYPE 1 DIABETES MELLITUS WITHOUT COMPLICATION (HCC): ICD-10-CM

## 2022-12-20 DIAGNOSIS — E05.90 HYPERTHYROIDISM: ICD-10-CM

## 2022-12-20 LAB
ALBUMIN SERPL-MCNC: 4.1 G/DL (ref 3.5–5.1)
ALP BLD-CCNC: 69 U/L (ref 38–126)
ALT SERPL-CCNC: 15 U/L (ref 11–66)
ANION GAP SERPL CALCULATED.3IONS-SCNC: 10 MEQ/L (ref 8–16)
AST SERPL-CCNC: 13 U/L (ref 5–40)
BASOPHILS # BLD: 0.6 %
BASOPHILS ABSOLUTE: 0 THOU/MM3 (ref 0–0.1)
BILIRUB SERPL-MCNC: 1.1 MG/DL (ref 0.3–1.2)
BILIRUBIN DIRECT: < 0.2 MG/DL (ref 0–0.3)
BUN BLDV-MCNC: 12 MG/DL (ref 7–22)
CALCIUM SERPL-MCNC: 9 MG/DL (ref 8.5–10.5)
CHLORIDE BLD-SCNC: 104 MEQ/L (ref 98–111)
CO2: 26 MEQ/L (ref 23–33)
CREAT SERPL-MCNC: 0.7 MG/DL (ref 0.4–1.2)
CREATININE, URINE: 196.5 MG/DL
EOSINOPHIL # BLD: 1 %
EOSINOPHILS ABSOLUTE: 0.1 THOU/MM3 (ref 0–0.4)
ERYTHROCYTE [DISTWIDTH] IN BLOOD BY AUTOMATED COUNT: 12.3 % (ref 11.5–14.5)
ERYTHROCYTE [DISTWIDTH] IN BLOOD BY AUTOMATED COUNT: 37.2 FL (ref 35–45)
GFR SERPL CREATININE-BSD FRML MDRD: > 60 ML/MIN/1.73M2
GLUCOSE BLD-MCNC: 145 MG/DL (ref 70–108)
HCT VFR BLD CALC: 42.5 % (ref 37–47)
HEMOGLOBIN: 14.1 GM/DL (ref 12–16)
IMMATURE GRANS (ABS): 0 THOU/MM3 (ref 0–0.07)
IMMATURE GRANULOCYTES: 0 %
LDL CHOLESTEROL DIRECT: 163.61 MG/DL
LYMPHOCYTES # BLD: 31.7 %
LYMPHOCYTES ABSOLUTE: 1.6 THOU/MM3 (ref 1–4.8)
MCH RBC QN AUTO: 28 PG (ref 26–33)
MCHC RBC AUTO-ENTMCNC: 33.2 GM/DL (ref 32.2–35.5)
MCV RBC AUTO: 84.3 FL (ref 81–99)
MICROALBUMIN UR-MCNC: 17.49 MG/DL
MICROALBUMIN/CREAT UR-RTO: 89 MG/G (ref 0–30)
MONOCYTES # BLD: 5.2 %
MONOCYTES ABSOLUTE: 0.3 THOU/MM3 (ref 0.4–1.3)
NUCLEATED RED BLOOD CELLS: 0 /100 WBC
PLATELET # BLD: 203 THOU/MM3 (ref 130–400)
PMV BLD AUTO: 10.5 FL (ref 9.4–12.4)
POTASSIUM SERPL-SCNC: 4.1 MEQ/L (ref 3.5–5.2)
RBC # BLD: 5.04 MILL/MM3 (ref 4.2–5.4)
SEG NEUTROPHILS: 61.5 %
SEGMENTED NEUTROPHILS ABSOLUTE COUNT: 3.2 THOU/MM3 (ref 1.8–7.7)
SODIUM BLD-SCNC: 140 MEQ/L (ref 135–145)
T3 FREE: 3.23 PG/ML (ref 2.02–4.43)
T4 FREE: 1.53 NG/DL (ref 0.93–1.76)
TOTAL PROTEIN: 7.1 G/DL (ref 6.1–8)
TSH SERPL DL<=0.05 MIU/L-ACNC: 0.01 UIU/ML (ref 0.4–4.2)
WBC # BLD: 5.2 THOU/MM3 (ref 4.8–10.8)

## 2022-12-20 PROCEDURE — G0108 DIAB MANAGE TRN  PER INDIV: HCPCS | Performed by: INTERNAL MEDICINE

## 2022-12-20 NOTE — PATIENT INSTRUCTIONS
Carb ratios 12am 9 grams, 7am 4.8 grams, 2pm 4.0 grams, 5pm 3.5grams,              10pm 4.2 grams  Keep working on bolusing BEFORE you eat                --if you have a food choice that tends to spike your blood             Sugar:  can your give your bolus a 5-10 minute head start  Great job treating low blood sugars before getting low!!!

## 2022-12-20 NOTE — TELEPHONE ENCOUNTER
Diabetes education. Zuleyma Babb seems to be struggling with meal clearance. She does bolus before most of her meals    Carb ratios: 12am 9 grams                     7am 5.0 grams                      3pm 4.0 grams                      5pm 3.8 grams                      10pm 4.5 grams    Dr. Shellie Leon,      Please authorize the Diabetes Clinic at [address] [place] [address] to teach/ assist Zuleyma Babb to make the following changes in her carb ratios:        Carb ratios: 12am 9 grams                     7am 5.0 grams  -incr 4.8 grams                      3pm 4.0 grams  -change to 2pm (to cover lunch)                      5pm 3.8 grams  -incr 3.5 grams                      10pm 4.5 grams  -incr 4.2 grams    If you agree, please note and return. Thank you.

## 2022-12-20 NOTE — PROGRESS NOTES
[facility]  [facility]. [address] [facility], [address]  [phone] (phone)  [phone] (fax)    Patient ID: Anuel Vivar 1980  Referring Provider: Dr. Babita Wang     Patient's name and  were verified. Subjective:    She presents for Her follow-up diabetic visit. She has type 1 diabetes mellitus. Home regimen includes: Insulin She is noncompliant some of the time. Assessment:     Lab Results   Component Value Date/Time    LABA1C 6.6 10/17/2022 02:27 PM    LABA1C 6.8 2022 08:58 AM    BUN 9 11/10/2022 04:44 PM    CREATININE 0.8 11/10/2022 04:44 PM     Vitals:    22 0925 22 09   BP: (!) 141/72 120/84   Site: Right Upper Arm Left Upper Arm   Position: Sitting Sitting   Pulse: 64    Temp: 97.2 °F (36.2 °C)    Weight: 229 lb 9.6 oz (104.1 kg)    Height: 5' 10\" (1.778 m)      Wt Readings from Last 3 Encounters:   22 229 lb 9.6 oz (104.1 kg)   22 232 lb 6.4 oz (105.4 kg)   22 230 lb (104.3 kg)     Ht Readings from Last 3 Encounters:   22 5' 10\" (1.778 m)   22 5' 10\" (1.778 m)   22 5' 10\" (1.778 m)       Diabetes Pharmacotherapy:  Novolog insulin per Medtronic 770G pump    Glucose Trends:   Current monitoring regimen: Guardian CGM - checks 4+ times daily  Home blood sugar trends:    -V. High: 5%, High: 20%, Target: 75%, Low: 0%, V. Low: 0%   -Nocturnal blood sugars are stable with automode. Appears to still struggle with meal clearance. Rare, large spikes in pp blood sugar are often the result of missed bolus  Any episodes of hypoglycemia?  yes - catches before BS is 70 r/t CGM--highest risk during work hours   -Treats with glucose tabs    Lifestyle Factors:   Previous visit with dietician: remote  Current diet: B: inman bagel            L: none                       D: nut crackers/ pepperoni/ cheese/ nuts                       Snacks: random                       Beverages: water, unsw tea  Current exercise: ADL's--vry busy at [employer] and [place]    Health Maintenance:  Eye exam current (within one year): no Date: [facility]22/21[facility]  Any history of foot problems? no  Foot exam up to date: yes Date: 6/13/22, [facility] clinic  Immunizations up to date:    Pneumonia: no   Influenza: no  The 10-year ASCVD risk score (Gayathri VELÁSQUEZ, et al., 2019) is: 1.3%    Values used to calculate the score:      Age: 43 years      Sex: Female      Is Non- : No      Diabetic: Yes      Tobacco smoker: No      Systolic Blood Pressure: 983 mmHg      Is BP treated: No      HDL Cholesterol: 44 mg/dL      Total Cholesterol: 177 mg/dL   Last panel - LDL:   Lab Results   Component Value Date    LDLCALC 119 05/20/2021    LDLDIRECT 125.85 09/26/2022     Taking ASA:  No   Taking statin: No - Not identified  Last microalbumin/creatinine ratio:   Microalbumin Creatinine Ratio   Date Value Ref Range Status   06/13/2022 <57  Final      Taking ACE/ARB: No - not identified  Depression screening completed. 6/13/2022    Focus:   Diabetes Education. Last A1C: 6.6% 10/17/22. Janneth Fowler is frustrated. 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 Her only s/s is fatigue. She is doing well noting trends in her BS and catching low BS before they happen. She seems to struggle with meal clearance; sometimes r/t timing of bolus. Basal/ bolus ratio is good at 49%/ 51%. She could benefit from small change in carb ratios. Much encouragement given to focus on personal health first. We did discussed technology options for pump therapy at this time. Timing for release of Medtronic's new 780G is unknown.      Curriculum Area Focus: Glucose checks/goals, Carbohydrate counting/meal planning, Hypoglycemia management, and Pattern management  DSME PLAN:     Carb ratios 12am 9 grams, 7am 4.8 grams, 2pm 4.0 grams, 5pm 3.5grams, 10pm 4.2 grams  Keep working on [address] you eat                --if you have a food choice that tends to spike your blood             Sugar:  can your give your bolus a 5-10 minute head start  Great job treating low blood sugars before getting low!!! Goals Addressed                   This Visit's Progress     bolus before eating   On track     Exercise 3x per week (30 min per time)   Not on track     Reduce calorie intake to 1800 calories per day   On track           Meter download, medications, PMH and nursing assessment reviewed. Shavonne Youssef states She is willing to participate in this plan of care and verbalized understanding of all instructions provided. Teach back used to verify comprehension. Follow-up: 6 months    Total time involved in direct patient education: 60 minutes.

## 2022-12-23 LAB — THYROID STIMULATING IMMUNOGLOBULIN: 0.2 IU/L

## 2022-12-27 ENCOUNTER — TELEPHONE (OUTPATIENT)
Dept: INTERNAL MEDICINE CLINIC | Age: 42
End: 2022-12-27

## 2022-12-27 NOTE — TELEPHONE ENCOUNTER
----- Message from Lucrecia Dickson MD sent at 12/27/2022 10:08 AM EST -----  Please call patient and let them know results were abnormal - elevated microalbumin - strongly suggest restarting lisinopril 2.5 mg daily #90 and refill.  - need to be <100 in diabetic patient - want her to start pravastatin 20 mg daily. She didn't tolerate Lipitor and Crestor in past.  No real reason that she stopped the pravastatin. This is the best one if worried about memory issues. Check LDL and hepatic panel in 3 months. Repeat visit in 3 months. Bilirubin normal, defer TSH to Dr. Lena Wang.

## 2022-12-28 ENCOUNTER — TELEPHONE (OUTPATIENT)
Dept: INTERNAL MEDICINE CLINIC | Age: 42
End: 2022-12-28

## 2022-12-28 DIAGNOSIS — E78.00 PURE HYPERCHOLESTEROLEMIA: ICD-10-CM

## 2022-12-28 DIAGNOSIS — E78.00 PURE HYPERCHOLESTEROLEMIA: Primary | ICD-10-CM

## 2022-12-28 DIAGNOSIS — E11.9 DIABETES MELLITUS, NEW ONSET (HCC): Primary | ICD-10-CM

## 2022-12-28 DIAGNOSIS — Z51.81 THERAPEUTIC DRUG MONITORING: ICD-10-CM

## 2022-12-28 RX ORDER — PRAVASTATIN SODIUM 20 MG
20 TABLET ORAL DAILY
Qty: 90 TABLET | Refills: 1 | OUTPATIENT
Start: 2022-12-28

## 2022-12-28 RX ORDER — LISINOPRIL 5 MG/1
2.5 TABLET ORAL DAILY
Qty: 45 TABLET | Refills: 1 | OUTPATIENT
Start: 2022-12-28

## 2022-12-28 NOTE — TELEPHONE ENCOUNTER
Can you add a BMP to those labs to be done after starting statin and lisinopril - need to look at potassium and Cr.

## 2022-12-28 NOTE — TELEPHONE ENCOUNTER
Patient informed of results and recommendations . See refill encounter 12/28/22 . Labs ordered and mailed to patient , appointment scheduled for 3/6/23. Quality 110: Preventive Care And Screening: Influenza Immunization: Influenza Immunization not Administered because Patient Refused. Detail Level: Detailed

## 2022-12-28 NOTE — TELEPHONE ENCOUNTER
----- Message from Juan Carlos Virk MD sent at 12/27/2022 10:08 AM EST -----  Please call patient and let them know results were abnormal - elevated microalbumin - strongly suggest restarting lisinopril 2.5 mg daily #90 and refill.  - need to be <100 in diabetic patient - want her to start pravastatin 20 mg daily. She didn't tolerate Lipitor and Crestor in past.  No real reason that she stopped the pravastatin. This is the best one if worried about memory issues. Check LDL and hepatic panel in 3 months. Repeat visit in 3 months. Bilirubin normal, defer TSH to Dr. Eden Erickson.

## 2023-01-08 NOTE — RESULT ENCOUNTER NOTE
Thyroid stimulating immunoglobulin is now negative implying that graves disease is in remission. Tsh is still suppressed.

## 2023-03-06 ENCOUNTER — OFFICE VISIT (OUTPATIENT)
Dept: INTERNAL MEDICINE CLINIC | Age: 43
End: 2023-03-06
Payer: COMMERCIAL

## 2023-03-06 VITALS
DIASTOLIC BLOOD PRESSURE: 70 MMHG | HEIGHT: 70 IN | HEART RATE: 68 BPM | SYSTOLIC BLOOD PRESSURE: 110 MMHG | TEMPERATURE: 97.9 F | WEIGHT: 237.2 LBS | BODY MASS INDEX: 33.96 KG/M2

## 2023-03-06 DIAGNOSIS — E78.00 PURE HYPERCHOLESTEROLEMIA: ICD-10-CM

## 2023-03-06 DIAGNOSIS — E80.6 UNCONJUGATED HYPERBILIRUBINEMIA: ICD-10-CM

## 2023-03-06 DIAGNOSIS — R80.9 TYPE 1 DIABETES MELLITUS WITH MICROALBUMINURIA (HCC): Primary | ICD-10-CM

## 2023-03-06 DIAGNOSIS — E10.9 TYPE 1 DIABETES MELLITUS WITHOUT COMPLICATION (HCC): ICD-10-CM

## 2023-03-06 DIAGNOSIS — E10.29 TYPE 1 DIABETES MELLITUS WITH MICROALBUMINURIA (HCC): Primary | ICD-10-CM

## 2023-03-06 LAB — HBA1C MFR BLD: 7.3 % (ref 4.3–5.7)

## 2023-03-06 PROCEDURE — 83036 HEMOGLOBIN GLYCOSYLATED A1C: CPT | Performed by: INTERNAL MEDICINE

## 2023-03-06 PROCEDURE — 3051F HG A1C>EQUAL 7.0%<8.0%: CPT | Performed by: INTERNAL MEDICINE

## 2023-03-06 PROCEDURE — 99214 OFFICE O/P EST MOD 30 MIN: CPT | Performed by: INTERNAL MEDICINE

## 2023-03-06 RX ORDER — INSULIN ASPART 100 [IU]/ML
INJECTION, SOLUTION INTRAVENOUS; SUBCUTANEOUS
Qty: 30 ML | Refills: 5 | Status: SHIPPED | OUTPATIENT
Start: 2023-03-06

## 2023-03-06 RX ORDER — UBIDECARENONE 75 MG
CAPSULE ORAL DAILY
COMMUNITY

## 2023-03-06 RX ORDER — PRAVASTATIN SODIUM 20 MG
20 TABLET ORAL DAILY
Qty: 90 TABLET | Refills: 1 | Status: SHIPPED | OUTPATIENT
Start: 2023-03-06

## 2023-03-06 RX ORDER — LISINOPRIL 5 MG/1
2.5 TABLET ORAL DAILY
Qty: 45 TABLET | Refills: 1 | Status: SHIPPED | OUTPATIENT
Start: 2023-03-06

## 2023-03-06 RX ORDER — BIOTIN 10 MG
10 TABLET ORAL DAILY
COMMUNITY

## 2023-03-06 SDOH — ECONOMIC STABILITY: FOOD INSECURITY: WITHIN THE PAST 12 MONTHS, THE FOOD YOU BOUGHT JUST DIDN'T LAST AND YOU DIDN'T HAVE MONEY TO GET MORE.: NEVER TRUE

## 2023-03-06 SDOH — ECONOMIC STABILITY: INCOME INSECURITY: HOW HARD IS IT FOR YOU TO PAY FOR THE VERY BASICS LIKE FOOD, HOUSING, MEDICAL CARE, AND HEATING?: SOMEWHAT HARD

## 2023-03-06 SDOH — ECONOMIC STABILITY: HOUSING INSECURITY
IN THE LAST 12 MONTHS, WAS THERE A TIME WHEN YOU DID NOT HAVE A STEADY PLACE TO SLEEP OR SLEPT IN A SHELTER (INCLUDING NOW)?: NO

## 2023-03-06 SDOH — ECONOMIC STABILITY: FOOD INSECURITY: WITHIN THE PAST 12 MONTHS, YOU WORRIED THAT YOUR FOOD WOULD RUN OUT BEFORE YOU GOT MONEY TO BUY MORE.: NEVER TRUE

## 2023-03-06 ASSESSMENT — PATIENT HEALTH QUESTIONNAIRE - PHQ9
SUM OF ALL RESPONSES TO PHQ QUESTIONS 1-9: 0
1. LITTLE INTEREST OR PLEASURE IN DOING THINGS: 0
SUM OF ALL RESPONSES TO PHQ QUESTIONS 1-9: 0
2. FEELING DOWN, DEPRESSED OR HOPELESS: 0
SUM OF ALL RESPONSES TO PHQ QUESTIONS 1-9: 0
SUM OF ALL RESPONSES TO PHQ9 QUESTIONS 1 & 2: 0
SUM OF ALL RESPONSES TO PHQ QUESTIONS 1-9: 0

## 2023-03-06 NOTE — PROGRESS NOTES
take them. Patient doesn't have hypertension, removed from chart. Normal renal function 12/2022    Hypercholesterolemia =- LDL not controlled at 163 12/2022 -started pravastatin but not taking consistently - encouraged daily use. Labs due now. Elevated bilirubin - check CMP now. Clau Peña MD    . [facility]  [address]  Dept: [phone]  Dept Fax: [phone] : [phone]

## 2023-03-09 LAB — LDL CHOLESTEROL DIRECT: 109.62 MG/DL

## 2023-03-13 ENCOUNTER — NURSE ONLY (OUTPATIENT)
Dept: LAB | Age: 43
End: 2023-03-13

## 2023-03-20 PROBLEM — R80.9 MICROALBUMINURIA: Status: ACTIVE | Noted: 2017-08-31

## 2023-03-21 LAB — CYTOLOGY THIN PREP PAP: NORMAL

## 2023-05-15 ENCOUNTER — NURSE ONLY (OUTPATIENT)
Dept: LAB | Age: 43
End: 2023-05-15

## 2023-05-15 DIAGNOSIS — E05.90 HYPERTHYROIDISM: ICD-10-CM

## 2023-05-15 LAB
ALBUMIN SERPL BCG-MCNC: 4.4 G/DL (ref 3.5–5.1)
ALP SERPL-CCNC: 70 U/L (ref 38–126)
ALT SERPL W/O P-5'-P-CCNC: 23 U/L (ref 11–66)
AST SERPL-CCNC: 15 U/L (ref 5–40)
BILIRUB CONJ SERPL-MCNC: < 0.2 MG/DL (ref 0–0.3)
BILIRUB SERPL-MCNC: 1.4 MG/DL (ref 0.3–1.2)
PROT SERPL-MCNC: 6.8 G/DL (ref 6.1–8)
T4 FREE SERPL-MCNC: 1.74 NG/DL (ref 0.93–1.76)
TSH SERPL DL<=0.005 MIU/L-ACNC: < 0.005 UIU/ML (ref 0.4–4.2)

## 2023-05-16 LAB — T3FREE SERPL-MCNC: 3.55 PG/ML (ref 2.02–4.43)

## 2023-05-17 LAB — TSI SER-ACNC: 0.26 IU/L

## 2023-06-19 ENCOUNTER — OFFICE VISIT (OUTPATIENT)
Dept: INTERNAL MEDICINE CLINIC | Age: 43
End: 2023-06-19

## 2023-06-19 ENCOUNTER — OFFICE VISIT (OUTPATIENT)
Dept: INTERNAL MEDICINE CLINIC | Age: 43
End: 2023-06-19
Payer: COMMERCIAL

## 2023-06-19 VITALS
WEIGHT: 237.4 LBS | HEIGHT: 70 IN | SYSTOLIC BLOOD PRESSURE: 112 MMHG | DIASTOLIC BLOOD PRESSURE: 84 MMHG | HEART RATE: 64 BPM | BODY MASS INDEX: 33.99 KG/M2 | TEMPERATURE: 97.2 F

## 2023-06-19 VITALS
SYSTOLIC BLOOD PRESSURE: 112 MMHG | DIASTOLIC BLOOD PRESSURE: 84 MMHG | HEART RATE: 64 BPM | BODY MASS INDEX: 33.99 KG/M2 | HEIGHT: 70 IN | WEIGHT: 237.4 LBS

## 2023-06-19 DIAGNOSIS — E10.29 TYPE 1 DIABETES MELLITUS WITH MICROALBUMINURIA (HCC): Primary | ICD-10-CM

## 2023-06-19 DIAGNOSIS — R80.9 TYPE 1 DIABETES MELLITUS WITH MICROALBUMINURIA (HCC): Primary | ICD-10-CM

## 2023-06-19 DIAGNOSIS — E78.00 PURE HYPERCHOLESTEROLEMIA: ICD-10-CM

## 2023-06-19 DIAGNOSIS — E80.6 UNCONJUGATED HYPERBILIRUBINEMIA: ICD-10-CM

## 2023-06-19 PROCEDURE — 3051F HG A1C>EQUAL 7.0%<8.0%: CPT | Performed by: INTERNAL MEDICINE

## 2023-06-19 PROCEDURE — 99214 OFFICE O/P EST MOD 30 MIN: CPT | Performed by: INTERNAL MEDICINE

## 2023-06-19 RX ORDER — LISINOPRIL 5 MG/1
2.5 TABLET ORAL DAILY
Qty: 45 TABLET | Refills: 1 | Status: SHIPPED | OUTPATIENT
Start: 2023-06-19

## 2023-06-19 RX ORDER — PRAVASTATIN SODIUM 20 MG
20 TABLET ORAL DAILY
Qty: 90 TABLET | Refills: 1 | Status: SHIPPED | OUTPATIENT
Start: 2023-06-19

## 2023-06-19 RX ORDER — INSULIN ASPART 100 [IU]/ML
INJECTION, SOLUTION INTRAVENOUS; SUBCUTANEOUS
Qty: 30 ML | Refills: 5 | Status: SHIPPED | OUTPATIENT
Start: 2023-06-19

## 2023-06-19 NOTE — PROGRESS NOTES
[facility]  [facility] [address] [address] [address], [facility] [place], [facility], [address]  [phone] (phone)  [phone] (fax)    Patient ID: Yandel Hernández 1980  Referring Provider: Dr. Eren Baires     Patient's name and  were verified. Subjective:    She presents for a follow-up diabetic visit. She has type 1 diabetes mellitus. Home regimen includes: Insulin She is compliant a majority of the time. Assessment:     Lab Results   Component Value Date/Time    LABA1C 7.3 2023 11:32 AM    LABA1C 6.8 2022 08:58 AM    BUN 12 2022 09:56 AM    CREATININE 0.7 2022 09:56 AM     Vitals:    23 0846   BP: 112/84   Site: Right Upper Arm   Position: Sitting   Pulse: 64   Temp: 97.2 °F (36.2 °C)   Weight: 237 lb 6.4 oz (107.7 kg)   Height: 5' 10\" (1.778 m)     Wt Readings from Last 3 Encounters:   23 237 lb 6.4 oz (107.7 kg)   23 237 lb 6.4 oz (107.7 kg)   05/15/23 239 lb (108.4 kg)     Ht Readings from Last 3 Encounters:   23 5' 10\" (1.778 m)   23 5' 10\" (1.778 m)   05/15/23 5' 11\" (1.803 m)       Diabetes Pharmacotherapy:  Novolog insulin per Medtronic 770G pump    Glucose Trends:   Glucose at 1 hrs PPD today resulted at 164mg/dl  Current monitoring regimen: Guardian CGM - checks 4+ times daily  Home blood sugar trends:    -V. High: 6%, High: 26%, Target: 67%, Low: 1 %, V. Low: 0%   -Average Glucose: 159mg/dL; GMI: 7.1%;  %time CGM active 76%              -glucose levels variable; with very hectic schedule  Any episodes of hypoglycemia?  yes - 2-4 times per week;  usually during the work day or in the garden   -Treats with glucose tabs or sunkist chews    Lifestyle Factors:   Previous visit with dietician: remote  Current diet: B: protein shake or omlette/ saus/ toast            L: cottage cheese/ fruit                       D: salad/ berrier/ tomatoe                       Snacks: cheesecake bedtime                       Beverages: water; unsw tea  Current exercise: very active

## 2023-06-19 NOTE — PATIENT INSTRUCTIONS
Upgrading your Medtronic pump      [employer]. [employer]               Medtronic 780G with guardian 4 sensor  As much as possible, bolus before you eat-as often as possible  Always wear your support for your feet  Think about more temp basal and less eating to prevent                  Low blood sugar  Ant questions, you can always call!

## 2023-09-11 ENCOUNTER — OFFICE VISIT (OUTPATIENT)
Dept: INTERNAL MEDICINE CLINIC | Age: 43
End: 2023-09-11
Payer: COMMERCIAL

## 2023-09-11 ENCOUNTER — OFFICE VISIT (OUTPATIENT)
Dept: INTERNAL MEDICINE CLINIC | Age: 43
End: 2023-09-11

## 2023-09-11 VITALS
DIASTOLIC BLOOD PRESSURE: 70 MMHG | HEIGHT: 70 IN | WEIGHT: 238 LBS | HEART RATE: 68 BPM | BODY MASS INDEX: 34.07 KG/M2 | SYSTOLIC BLOOD PRESSURE: 110 MMHG

## 2023-09-11 VITALS — BODY MASS INDEX: 34.07 KG/M2 | HEIGHT: 70 IN | WEIGHT: 238 LBS

## 2023-09-11 DIAGNOSIS — E10.9 TYPE 1 DIABETES MELLITUS WITHOUT COMPLICATION (HCC): Primary | ICD-10-CM

## 2023-09-11 DIAGNOSIS — R80.9 TYPE 1 DIABETES MELLITUS WITH MICROALBUMINURIA (HCC): Primary | ICD-10-CM

## 2023-09-11 DIAGNOSIS — E10.29 TYPE 1 DIABETES MELLITUS WITH MICROALBUMINURIA (HCC): Primary | ICD-10-CM

## 2023-09-11 PROCEDURE — NBSRV NON-BILLABLE SERVICE: Performed by: DIETITIAN, REGISTERED

## 2023-09-11 PROCEDURE — 3051F HG A1C>EQUAL 7.0%<8.0%: CPT | Performed by: INTERNAL MEDICINE

## 2023-09-11 PROCEDURE — 99214 OFFICE O/P EST MOD 30 MIN: CPT | Performed by: INTERNAL MEDICINE

## 2023-09-11 NOTE — PROGRESS NOTES
[id number]  [facility]      [facility]  2006    WISDOM TOOTH EXTRACTION         Current Outpatient Medications   Medication Sig Dispense Refill    insulin aspart (NOVOLOG) 100 UNIT/ML injection vial Use for continuous infusion in insulin pump. Max dose 100 units daily . 30 mL 5    lisinopril (PRINIVIL;ZESTRIL) 5 MG tablet Take 0.5 tablets by mouth daily (Patient not taking: Reported on 2023) 45 tablet 1    pravastatin (PRAVACHOL) 20 MG tablet Take 1 tablet by mouth daily (Patient not taking: Reported on 2023) 90 tablet 1    Cholecalciferol (VITAMIN D3) 125 MCG (5000 UT) TABS Take by mouth daily (Patient not taking: Reported on 2023)      Omega-3 Fatty Acids (OMEGA 3 PO) Take by mouth daily (Patient not taking: Reported on 2023)      Biotin 10 MG tablet Take 10 mg by mouth daily (Patient not taking: Reported on 2023)      Coenzyme Q10 (CO Q-10) 200 MG CAPS Take by mouth daily (Patient not taking: Reported on 2023)      TURMERIC PO Take by mouth daily (Patient not taking: Reported on 2023)      Insulin Syringe-Needle U-100 30G X 1/2\" 0.5 ML MISC Use 3 times daily if needed (Patient not taking: Reported on 2023) 100 each 3     No current facility-administered medications for this visit.        Allergies   Allergen Reactions    Crestor [Rosuvastatin Calcium]      Mental fogginess    Lipitor [Atorvastatin Calcium]      Constipation, stomach and leg cramps       Social History     Socioeconomic History    Marital status:      Spouse name: Not on file    Number of children: Not on file    Years of education: Not on file    Highest education level: Not on file   Occupational History    Occupation: Housewife   Tobacco Use    Smoking status: Former     Packs/day: 0.30     Years: 2.00     Additional pack years: 0.00     Total pack years: 0.60     Types: Cigarettes     Quit date: 2002     Years since quittin.7    Smokeless tobacco: Never   Substance and Sexual

## 2023-09-11 NOTE — PROGRESS NOTES
[address] [facility]  [address] [facility], [address] [facility], [address] [place] [address] Ave  [phone] (phone)  [phone] (fax)    Patient Name: Nicolasa Cueva. Date of Birth: [birth year]. MRN: [id number]      Assessment: Patient is a 43 y.o. female seen for Initial MNT visit for Type 1 DB.     -Nutritionally relevant labs:   Lab Results   Component Value Date/Time    LABA1C 7.3 (H) 03/06/2023 11:32 AM    LABA1C 6.6 (H) 10/17/2022 02:27 PM    LABA1C 6.8 06/13/2022 08:58 AM    LABA1C 6.7 (H) 12/13/2021 07:57 AM    LABA1C 8.2 10/01/2018 12:00 AM    LABA1C 6.7 02/03/2016 01:44 PM    GLUCOSE 145 (H) 12/20/2022 09:56 AM    GLUCOSE 189 (H) 11/10/2022 04:44 PM    GLUCOSE 141 (H) 09/16/2019 09:03 AM    CHOL 177 05/20/2021 10:21 AM    HDL 44 05/20/2021 10:21 AM    LDLCALC 119 05/20/2021 10:21 AM    TRIG 69 05/20/2021 10:21 AM     6/19/23 - RN-YANYE f/u visit:  Upgrading your Medtronic pump      [employer]               Medtronic 780G with guardian 4 sensor  As much as possible, bolus before you eat-as often as possible  Always wear your support for your feet  Think about more temp basal and less eating to prevent                  Low blood sugar  Any questions, you can always call! Today's Visit:  Diabetes Pharmacotherapy:  Novolog insulin per Medtronic 780G pump     -Blood sugar trends: Downloaded Insulin pump report - Guardian CGM close loop system with [employer] 780G  TIR: 77% (compared to 71% prior 2 weeks)  High: 19% (compared to 24% prior 2 weeks)  Very High: 4% (same as prior 2 weeks)  Report shows BS dropping below 70 - No % of lows (compared to 1% prior 2 weeks)    Pt c/o she is feeding her BS's when she has low BS's. 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 Pt states Since with the newer Medtronic pump - High BS's seem to recover better.   Pt works many hours at her [employer]:

## 2024-02-07 NOTE — TELEPHONE ENCOUNTER
Called script to [facility] , spoke with Yecenia MERCEDES . Insurance will inly cover the generic Aspart

## 2024-02-08 RX ORDER — INSULIN ASPART 100 [IU]/ML
INJECTION, SOLUTION INTRAVENOUS; SUBCUTANEOUS
Qty: 30 ML | Refills: 5 | OUTPATIENT
Start: 2024-02-08

## 2024-03-27 ENCOUNTER — TELEPHONE (OUTPATIENT)
Age: 44
End: 2024-03-27

## 2024-03-27 ENCOUNTER — CLINICAL DOCUMENTATION (OUTPATIENT)
Age: 44
End: 2024-03-27

## 2024-03-27 DIAGNOSIS — E05.90 HYPERTHYROIDISM: Primary | ICD-10-CM

## 2024-03-27 LAB
BUN BLDV-MCNC: 12 MG/DL
CALCIUM SERPL-MCNC: 8.8 MG/DL
CHLORIDE BLD-SCNC: 106 MMOL/L
CHOLESTEROL, TOTAL: 182 MG/DL
CHOLESTEROL/HDL RATIO: NORMAL
CO2: 26 MMOL/L
CREAT SERPL-MCNC: 0.7 MG/DL
CREATININE, URINE: 161.6
EGFR: >90
GLUCOSE BLD-MCNC: 153 MG/DL
HDLC SERPL-MCNC: 44 MG/DL (ref 35–70)
LDL CHOLESTEROL CALCULATED: 125 MG/DL (ref 0–160)
MICROALBUMIN/CREAT 24H UR: 6.9 MG/G{CREAT}
MICROALBUMIN/CREAT UR-RTO: 43
NONHDLC SERPL-MCNC: NORMAL MG/DL
POTASSIUM SERPL-SCNC: 4.4 MMOL/L
SODIUM BLD-SCNC: 139 MMOL/L
TRIGL SERPL-MCNC: 65 MG/DL
VLDLC SERPL CALC-MCNC: 13 MG/DL

## 2024-04-02 ENCOUNTER — TELEPHONE (OUTPATIENT)
Dept: INTERNAL MEDICINE CLINIC | Age: 44
End: 2024-04-02

## 2024-04-02 RX ORDER — LISINOPRIL 5 MG/1
2.5 TABLET ORAL DAILY
COMMUNITY

## 2024-04-02 NOTE — TELEPHONE ENCOUNTER
Patient informed of results and recommendation . Patient states that she will restart the Lisinopril but she will nit go back in Pravastatin . Patient states that she rarely uses any Nsaids .

## 2024-04-02 NOTE — TELEPHONE ENCOUNTER
----- Message from Ara Galeano MD sent at 4/1/2024 12:30 AM EDT -----  Please call patient and let them know results - show elevated microalbumin - restart low dose lisinopril, LDL > 100 - restart statin.  Elevated bilirubin - has she been using NSAIDs again?

## 2024-05-20 ENCOUNTER — NURSE ONLY (OUTPATIENT)
Dept: LAB | Age: 44
End: 2024-05-20

## 2024-05-20 ENCOUNTER — OFFICE VISIT (OUTPATIENT)
Age: 44
End: 2024-05-20
Payer: COMMERCIAL

## 2024-05-20 VITALS
BODY MASS INDEX: 31.55 KG/M2 | SYSTOLIC BLOOD PRESSURE: 116 MMHG | HEART RATE: 63 BPM | HEIGHT: 70 IN | WEIGHT: 220.38 LBS | DIASTOLIC BLOOD PRESSURE: 80 MMHG

## 2024-05-20 DIAGNOSIS — E10.9 TYPE 1 DIABETES MELLITUS WITHOUT COMPLICATION (HCC): Primary | ICD-10-CM

## 2024-05-20 DIAGNOSIS — E78.2 MIXED HYPERLIPIDEMIA: ICD-10-CM

## 2024-05-20 DIAGNOSIS — E05.90 HYPERTHYROIDISM: ICD-10-CM

## 2024-05-20 DIAGNOSIS — E10.9 TYPE 1 DIABETES MELLITUS WITHOUT COMPLICATION (HCC): ICD-10-CM

## 2024-05-20 DIAGNOSIS — E05.00 GRAVES DISEASE: ICD-10-CM

## 2024-05-20 LAB
ALBUMIN SERPL BCG-MCNC: 4 G/DL (ref 3.5–5.1)
ALP SERPL-CCNC: 70 U/L (ref 38–126)
ALT SERPL W/O P-5'-P-CCNC: 14 U/L (ref 11–66)
ANION GAP SERPL CALC-SCNC: 10 MEQ/L (ref 8–16)
AST SERPL-CCNC: 14 U/L (ref 5–40)
BILIRUB SERPL-MCNC: 1.5 MG/DL (ref 0.3–1.2)
BUN SERPL-MCNC: 9 MG/DL (ref 7–22)
CALCIUM SERPL-MCNC: 8.7 MG/DL (ref 8.5–10.5)
CHLORIDE SERPL-SCNC: 104 MEQ/L (ref 98–111)
CHOLEST SERPL-MCNC: 198 MG/DL (ref 100–199)
CO2 SERPL-SCNC: 24 MEQ/L (ref 23–33)
CREAT SERPL-MCNC: 0.6 MG/DL (ref 0.4–1.2)
CREAT UR-MCNC: 26 MG/DL
GFR SERPL CREATININE-BSD FRML MDRD: > 90 ML/MIN/1.73M2
GLUCOSE SERPL-MCNC: 174 MG/DL (ref 70–108)
HDLC SERPL-MCNC: 44 MG/DL
LDLC SERPL CALC-MCNC: 139 MG/DL
MICROALBUMIN UR-MCNC: < 1.2 MG/DL
MICROALBUMIN/CREAT RATIO PNL UR: 46 MG/G (ref 0–30)
POTASSIUM SERPL-SCNC: 3.9 MEQ/L (ref 3.5–5.2)
PROT SERPL-MCNC: 6.3 G/DL (ref 6.1–8)
SODIUM SERPL-SCNC: 138 MEQ/L (ref 135–145)
T4 FREE SERPL-MCNC: 1.23 NG/DL (ref 0.93–1.68)
TRIGL SERPL-MCNC: 73 MG/DL (ref 0–199)
TSH SERPL DL<=0.005 MIU/L-ACNC: 0.02 UIU/ML (ref 0.4–4.2)

## 2024-05-20 PROCEDURE — 95251 CONT GLUC MNTR ANALYSIS I&R: CPT | Performed by: INTERNAL MEDICINE

## 2024-05-20 PROCEDURE — G8427 DOCREV CUR MEDS BY ELIG CLIN: HCPCS | Performed by: INTERNAL MEDICINE

## 2024-05-20 PROCEDURE — G8417 CALC BMI ABV UP PARAM F/U: HCPCS | Performed by: INTERNAL MEDICINE

## 2024-05-20 PROCEDURE — 99215 OFFICE O/P EST HI 40 MIN: CPT | Performed by: INTERNAL MEDICINE

## 2024-05-20 PROCEDURE — 3046F HEMOGLOBIN A1C LEVEL >9.0%: CPT | Performed by: INTERNAL MEDICINE

## 2024-05-20 PROCEDURE — 1036F TOBACCO NON-USER: CPT | Performed by: INTERNAL MEDICINE

## 2024-05-20 PROCEDURE — 2022F DILAT RTA XM EVC RTNOPTHY: CPT | Performed by: INTERNAL MEDICINE

## 2024-05-20 NOTE — PROGRESS NOTES
CGMSINTERPRETATION    CGMS interpretation:  The patient is checking blood sugars 4 times a day using smart guard Medtronic continuous glucose monitoring system.  His download includes data from 5/7/2024 through 5/20/2024 and is sufficient for interpretation.  The CGM was active 96% of the time.  Average blood glucose is 168 mg/dL with a coefficient of variation of 31.1% and glucose management indicator of 7.3%  The patient was within the target range 64 percent of the time.  Hypoglycemia:  Blood glucose range between 54 mg/dL and 70mg/dL: 1%  Blood glucose is below 54 mg/dL: 0%  Hyperglycemia:  Blood glucoses between 181 and 250 mg/dL: 27%  Blood glucoses above 250 mg/dL: 8%  In summary, this patient is having mild postprandial hyperglycemia  Recommendations: Please refer to the assessment and plan section of

## 2024-05-20 NOTE — PROGRESS NOTES
[facility] [place] [facility]  [facility] ENDOCRINOLOGY  [address]. [address]  [facility] [address]  Dept: [phone]  Loc: [phone]     Visit Date: 2024    Gege Portillo is a 43 y.o. female who presents today for:  Chief Complaint   Patient presents with    Follow-up     Patient present for 1 year follow up, patient did not complete most recent labs.          Subjective:    Gege Portillo is a 43 y.o. , female who comes for f/u for hyperthyroidism.  She was diagnosed with hyperthyroidism about 11 years ago.  Underlying cause of the hyperthyroidism is Graves' disease.  The patient received Tapazole but she is currently not taking any.  Symptoms include weight loss, fatigue and I dryness.  She wears contacts but appears not to be able to adjust to them appropriately.    This patient also has type 1 diabetes melitis managed using pump Medtronic 780G the patient is changing her site every 3 days most of the time although it sometimes goes longer.  Blood sugars 4 times a day using smart guard.  Average blood glucose is 168 mg/dL with a glucose management indicator of 7.3%.  The patient was within the target range 64% of the time.  The symptoms including burning sensations in the feet, usually triggered by standing for long.  She also experiences blurriness of vision as noted above.  There are no open sores in the feet.   Past Medical History:   Diagnosis Date    Abnormal Pap smear     Diabetes mellitus (HCC) 2012    type I    Graves disease     Hyperlipidemia     Infertility, female       Past Surgical History:   Procedure Laterality Date    ANTERIOR CRUCIATE LIGAMENT REPAIR  1996     SECTION      SAMMY  2006    WISDOM TOOTH EXTRACTION  2003       Family History   Problem Relation Age of Onset    Heart Disease Mother 52        5 bypass CABBG    High Blood Pressure Mother         FMD    High Cholesterol Mother     Other Mother         Heart stent

## 2024-05-21 LAB
T3FREE SERPL-MCNC: 3 PG/ML (ref 2–4.4)
THYROGLOBULIN: NORMAL

## 2024-07-22 ENCOUNTER — OFFICE VISIT (OUTPATIENT)
Dept: INTERNAL MEDICINE CLINIC | Age: 44
End: 2024-07-22

## 2024-07-22 VITALS
WEIGHT: 220.2 LBS | HEART RATE: 72 BPM | BODY MASS INDEX: 31.52 KG/M2 | SYSTOLIC BLOOD PRESSURE: 122 MMHG | HEIGHT: 70 IN | DIASTOLIC BLOOD PRESSURE: 74 MMHG

## 2024-07-22 DIAGNOSIS — E10.29 TYPE 1 DIABETES MELLITUS WITH DIABETIC MICROALBUMINURIA (HCC): Primary | ICD-10-CM

## 2024-07-22 DIAGNOSIS — E78.00 PURE HYPERCHOLESTEROLEMIA: ICD-10-CM

## 2024-07-22 DIAGNOSIS — E10.9 TYPE 1 DIABETES MELLITUS WITHOUT COMPLICATION (HCC): ICD-10-CM

## 2024-07-22 DIAGNOSIS — R16.2 HEPATOSPLENOMEGALY: ICD-10-CM

## 2024-07-22 DIAGNOSIS — R80.9 TYPE 1 DIABETES MELLITUS WITH DIABETIC MICROALBUMINURIA (HCC): Primary | ICD-10-CM

## 2024-07-22 LAB — HBA1C MFR BLD: 7.2 % (ref 4.3–5.7)

## 2024-07-22 RX ORDER — LISINOPRIL 5 MG/1
2.5 TABLET ORAL DAILY
Qty: 45 TABLET | Refills: 1 | Status: SHIPPED | OUTPATIENT
Start: 2024-07-22

## 2024-07-22 RX ORDER — INSULIN ASPART 100 [IU]/ML
INJECTION, SOLUTION INTRAVENOUS; SUBCUTANEOUS
Qty: 30 ML | Refills: 5 | Status: SHIPPED | OUTPATIENT
Start: 2024-07-22

## 2024-07-22 RX ORDER — PRAVASTATIN SODIUM 20 MG
20 TABLET ORAL NIGHTLY
Qty: 90 TABLET | Refills: 1 | Status: SHIPPED | OUTPATIENT
Start: 2024-07-22

## 2024-07-22 SDOH — ECONOMIC STABILITY: FOOD INSECURITY: WITHIN THE PAST 12 MONTHS, THE FOOD YOU BOUGHT JUST DIDN'T LAST AND YOU DIDN'T HAVE MONEY TO GET MORE.: NEVER TRUE

## 2024-07-22 SDOH — ECONOMIC STABILITY: INCOME INSECURITY: HOW HARD IS IT FOR YOU TO PAY FOR THE VERY BASICS LIKE FOOD, HOUSING, MEDICAL CARE, AND HEATING?: NOT HARD AT ALL

## 2024-07-22 SDOH — ECONOMIC STABILITY: FOOD INSECURITY: WITHIN THE PAST 12 MONTHS, YOU WORRIED THAT YOUR FOOD WOULD RUN OUT BEFORE YOU GOT MONEY TO BUY MORE.: NEVER TRUE

## 2024-07-22 ASSESSMENT — PATIENT HEALTH QUESTIONNAIRE - PHQ9
SUM OF ALL RESPONSES TO PHQ QUESTIONS 1-9: 0
SUM OF ALL RESPONSES TO PHQ9 QUESTIONS 1 & 2: 0
2. FEELING DOWN, DEPRESSED OR HOPELESS: NOT AT ALL
1. LITTLE INTEREST OR PLEASURE IN DOING THINGS: NOT AT ALL
SUM OF ALL RESPONSES TO PHQ QUESTIONS 1-9: 0

## 2024-07-22 NOTE — ASSESSMENT & PLAN NOTE
CMP grossly unremarkable except for increased total bilirubin 1.5 consistent with prior elevation on 3/27/2024.  Patient was unable to follow-up for right upper quadrant ultrasound.  Is agreeable to rescheduling.  Reschedule RUQ US at earliest convenience.  Follow-up in 3 months to go over results.

## 2024-07-22 NOTE — ASSESSMENT & PLAN NOTE
Presents with blood glucose measurements.  Ongoing highs noted in the afternoons and evenings.  The patient acknowledges that she does not always have time to bolus appropriately prior to meals due to her busy schedule.  Is following with nutritionist at this facility, agrees to follow through with their recommendations as much as possible.  HbA1c today 7.2%, consistent with last measurement. Microalbumin:creatinine ratio elevated at 46, relatively stable from 3/27/2024 when it was 43.  Patient agrees to continue to try to implement boluses prior to meals  Patient is agreeable to try to take lisinopril and pravastatin as prescribed.  Follow-up in 3 months for ongoing surveillance of diabetes, including POCT HbA1c at that time.

## 2024-07-22 NOTE — PATIENT INSTRUCTIONS
Start pravastatin 20mg nightly. Take lisinopril as prescribed daily. Put pills by contact lens case and take your medications when you take out your contacts nightly.    Continue to bolus insulin 20 mins prior to meals.     Consider times when you can implement small changes.    Continue to follow with nutrition and endocrinology as scheduled.     Return in about 3 months (around 10/22/2024) for Follow up for diabetes medication management..

## 2024-07-22 NOTE — PROGRESS NOTES
Current Outpatient Medications   Medication Sig Dispense Refill    insulin aspart (NOVOLOG) 100 UNIT/ML injection vial Use for continuous infusion in insulin pump . Max dose 100 units daily 30 mL 5    pravastatin (PRAVACHOL) 20 MG tablet Take 1 tablet by mouth at bedtime 90 tablet 1    lisinopril (PRINIVIL;ZESTRIL) 5 MG tablet Take 0.5 tablets by mouth daily 45 tablet 1     No current facility-administered medications for this visit.       Allergies   Allergen Reactions    Crestor [Rosuvastatin Calcium]      Mental fogginess    Lipitor [Atorvastatin Calcium]      Constipation, stomach and leg cramps       Social History     Socioeconomic History    Marital status:      Spouse name: Not on file    Number of children: Not on file    Years of education: Not on file    Highest education level: Not on file   Occupational History    Occupation: Housewife   Tobacco Use    Smoking status: Former     Current packs/day: 0.00     Average packs/day: 0.3 packs/day for 2.0 years (0.6 ttl pk-yrs)     Types: Cigarettes     Start date: 2000     Quit date: 2002     Years since quittin.5    Smokeless tobacco: Never   Substance and Sexual Activity    Alcohol use: Not Currently     Alcohol/week: 0.8 standard drinks of alcohol     Types: 1 Standard drinks or equivalent per week     Comment: Occasionally    Drug use: No    Sexual activity: Yes     Partners: Male   Other Topics Concern    Not on file   Social History Narrative    ** Merged History Encounter **          Social Determinants of Health     Financial Resource Strain: Low Risk  (2024)    Overall Financial Resource Strain (CARDIA)     Difficulty of Paying Living Expenses: Not hard at all   Food Insecurity: No Food Insecurity (2024)    Hunger Vital Sign     Worried About Running Out of Food in the Last Year: Never true     Ran Out of Food in the Last Year: Never true   Transportation Needs: Unknown (2024)    PRAPARE - Transportation

## 2024-07-22 NOTE — ASSESSMENT & PLAN NOTE
Currently stable and under surveillance with Dr. Cottrell outpatient.  Follow with Dr. Cottrell as scheduled.

## 2024-07-22 NOTE — ASSESSMENT & PLAN NOTE
Lipid panel on 5/20/2024 shows total cholesterol 198, triglycerides 73, HDL 44, . Patient has tried rosuvastatin and atorvastatin in the past which were discontinued due to intolerance.  Patient is agreeable to trial of pravastatin, which is known to have fewer adverse effects including myalgias.  Acknowledges that she may have difficulty remembering to take this medication nightly.  Pravastatin 20 mg nightly prescribed  LDL prior to next visit in 3 months.

## 2024-10-14 LAB
LDL CHOLESTEROL DIRECT: 129.79 MG/DL
LDL CHOLESTEROL DIRECT: 129.79 MG/DL

## 2024-10-29 ENCOUNTER — TELEPHONE (OUTPATIENT)
Dept: INTERNAL MEDICINE CLINIC | Age: 44
End: 2024-10-29

## 2024-10-29 NOTE — TELEPHONE ENCOUNTER
----- Message from Dr. Ara Galeano MD sent at 10/25/2024  3:09 PM EDT -----  Please call patient and let them know results show unremarkable u/s - but LDL too high.  Is she taking pravastatin?  If she is - then need to increase to 40 mg daily.  If wasn't taking pravastatin prior to labs - then please start it.  Goal LDL < 100.

## 2024-10-29 NOTE — TELEPHONE ENCOUNTER
Left message for patient with results and instruction that LDL still too high if not taking Pravastatin need to restart it and if been on it she needs to increase too 40 mg daily . Please let the office know if you need an new script for increased dose .

## 2024-12-16 ENCOUNTER — OFFICE VISIT (OUTPATIENT)
Dept: INTERNAL MEDICINE CLINIC | Age: 44
End: 2024-12-16
Payer: COMMERCIAL

## 2024-12-16 ENCOUNTER — OFFICE VISIT (OUTPATIENT)
Dept: INTERNAL MEDICINE CLINIC | Age: 44
End: 2024-12-16

## 2024-12-16 VITALS
HEIGHT: 70 IN | SYSTOLIC BLOOD PRESSURE: 118 MMHG | WEIGHT: 212.6 LBS | DIASTOLIC BLOOD PRESSURE: 74 MMHG | HEART RATE: 71 BPM | TEMPERATURE: 98.1 F | BODY MASS INDEX: 30.43 KG/M2

## 2024-12-16 VITALS
HEIGHT: 70 IN | DIASTOLIC BLOOD PRESSURE: 74 MMHG | WEIGHT: 212.4 LBS | BODY MASS INDEX: 30.41 KG/M2 | HEART RATE: 71 BPM | SYSTOLIC BLOOD PRESSURE: 118 MMHG | TEMPERATURE: 98.1 F

## 2024-12-16 DIAGNOSIS — R80.9 TYPE 1 DIABETES MELLITUS WITH DIABETIC MICROALBUMINURIA (HCC): Primary | ICD-10-CM

## 2024-12-16 DIAGNOSIS — Z12.31 ENCOUNTER FOR SCREENING MAMMOGRAM FOR MALIGNANT NEOPLASM OF BREAST: ICD-10-CM

## 2024-12-16 DIAGNOSIS — E10.29 TYPE 1 DIABETES MELLITUS WITH DIABETIC MICROALBUMINURIA (HCC): Primary | ICD-10-CM

## 2024-12-16 DIAGNOSIS — E80.6 UNCONJUGATED HYPERBILIRUBINEMIA: ICD-10-CM

## 2024-12-16 DIAGNOSIS — E78.00 PURE HYPERCHOLESTEROLEMIA: ICD-10-CM

## 2024-12-16 LAB — HBA1C MFR BLD: 7.8 % (ref 4.3–5.7)

## 2024-12-16 PROCEDURE — 99214 OFFICE O/P EST MOD 30 MIN: CPT | Performed by: INTERNAL MEDICINE

## 2024-12-16 PROCEDURE — 3051F HG A1C>EQUAL 7.0%<8.0%: CPT | Performed by: INTERNAL MEDICINE

## 2024-12-16 RX ORDER — LISINOPRIL 5 MG/1
5 TABLET ORAL DAILY
Qty: 90 TABLET | Refills: 1 | Status: SHIPPED | OUTPATIENT
Start: 2024-12-16

## 2024-12-16 RX ORDER — INSULIN ASPART 100 [IU]/ML
INJECTION, SOLUTION INTRAVENOUS; SUBCUTANEOUS
Qty: 30 ML | Refills: 5 | Status: SHIPPED | OUTPATIENT
Start: 2024-12-16

## 2024-12-16 RX ORDER — SIMVASTATIN 10 MG
10 TABLET ORAL NIGHTLY
Qty: 90 TABLET | Refills: 1 | Status: SHIPPED | OUTPATIENT
Start: 2024-12-16

## 2024-12-16 NOTE — PATIENT INSTRUCTIONS
BG target 9p-12am   100mg  Avoid using ghost carbs after eating--this false-ly makes        The pump think that it is giving you enough basal                 Correction to keep your blood sugars down  Stay focused on giving bolus before eating  Compression sock 20-29/30mm/hg  Talk to Dr. Galeano about getting Lisinopril 2.5mg tablets

## 2024-12-16 NOTE — PROGRESS NOTES
[facility]  [address] [place] [address]  [phone] (phone)  [phone] (fax)    Patient ID: Gege Portillo [id number]  Referring Provider: Dr. Galeano      Patient's name and  were verified.    Subjective:    She presents for a follow-up diabetic visit. She has type 1 diabetes mellitus. She is compliant some of the time.  Assessment:     Lab Results   Component Value Date/Time    LABA1C 7.8 2024 09:46 AM    LABA1C 7.1 2023 11:19 AM    BUN 9 2024 10:44 AM    CREATININE 0.6 2024 10:44 AM     Vitals:    24 1024   BP: 118/74   Site: Left Upper Arm   Position: Sitting   Pulse: 71   Temp: 98.1 °F (36.7 °C)   Weight: 96.3 kg (212 lb 6.4 oz)   Height: 1.778 m (5' 10\")     Wt Readings from Last 3 Encounters:   24 96.4 kg (212 lb 9.6 oz)   24 96.3 kg (212 lb 6.4 oz)   24 99.9 kg (220 lb 3.2 oz)     Ht Readings from Last 3 Encounters:   24 1.778 m (5' 10\")   24 1.778 m (5' 10\")   24 1.778 m (5' 10\")     Est, Glom Filt Rate   Date Value Ref Range Status   2024 > 90 >60 ml/min/1.73m2 Final   2024 >90  Final         Diabetes Pharmacotherapy:  Novolog insulin per Medtronic 780G pump - Uses Automated Mode/ Smart guard    Glucose Trends:   Current monitoring regimen: Guardian CGM - checks 4+ times daily  Home blood sugar trends:    -V. High: 6%, High: 22%, Target: 72%, Low: 0%, V. Low: 0%   -Average Glucose: 158mg/dL; GMI: --%;  %time CGM active 61%              -glucose excursions with meals; random lows through the day. Gege reports keeping blood sugars higher r/t fear of lows.  Any episodes of hypoglycemia? yes - 2 times per week average; timing varies   -Treats with mt dew or juice 4-6 oz    Lifestyle Factors:   Previous visit with dietician: yes - 2021  Current diet: lower carb meal plan                       B: protein bar                             2 egg omelette/ saus / gravy

## 2024-12-16 NOTE — PROGRESS NOTES
simvastatin (ZOCOR) 10 MG tablet    LDL Cholesterol, Direct    Hepatic Function Panel      3. Unconjugated hyperbilirubinemia  Hepatic Function Panel      4. Encounter for screening mammogram for malignant neoplasm of breast  [facility] DIGITAL SCREEN BILATERAL        DM type 1 with microalbuminuria - uncontrolled - HgA1c 7.8 12/2024 - tightened evening target and to download in 2 weeks - if not better consider lowering carb ratio.  Encouraged her to take insulin 20 minutes prior to meal.  Be more consistent with meals.  Encouraged her to take lisinopril every day (not on it now).  BMP, Microalbumin and lipid due 5/2024.  CMP now.  Wanted lisinopril 5 mg daily so not to cut pill but need to watch BP and potassium/Cr.    Hypercholesterolemia - uncontrolled - LDL not controlled at 130 10/2024 on pravastatin 20 mg daily briefly - taking 5/7 days per week.  No longer on this due to side effects.  Will change to Zocor 10 mg daily and repeat labs in a month.     Indirect hyperbilirubinemia -uncontrolled  TB 1.4->1.2 11/2023-> 1.5 5/2024 - likely up again as taking NSAIDs - rare use now -check CMP in 1 month.  Cut out processed food for suspect fatty liver.      Hepatosplenomegaly - at previous visit she complained of RUQ pain at BB game with popcorn, and water.  Known hepatosplenomegaly on previous CT 11/2022 - Abdominal U/S 10/2024- normal except gallstones.  Check CBC to monitor platelets.  Platelet normal 3/2024.  Repeat CMP in a month to follow bilirubin/LFTs.      HM - I am not her PCP but as she is over due for mammogram -did order, she is due for Mirena replacement - Dr. Álvarez has retired - will contact office and set up with his partner.  She was hoping to get mammogram and GYN visit on the same day.    I wanted follow up in 3 months but she states she can't/won't come till 6 month follow up.  Explained my concern about HgA1c 7.8 and need to take this seriously and get under control.  Labs due in a month after

## 2025-06-04 ENCOUNTER — TELEPHONE (OUTPATIENT)
Dept: INTERNAL MEDICINE CLINIC | Age: 45
End: 2025-06-04

## 2025-06-04 DIAGNOSIS — E10.29 TYPE 1 DIABETES MELLITUS WITH DIABETIC MICROALBUMINURIA (HCC): Primary | ICD-10-CM

## 2025-06-04 DIAGNOSIS — R80.9 TYPE 1 DIABETES MELLITUS WITH DIABETIC MICROALBUMINURIA (HCC): Primary | ICD-10-CM

## 2025-06-04 NOTE — TELEPHONE ENCOUNTER
We now have an electronic referral. Please fill out the hard stops on the attached referral, sign and send back. Thank you

## 2025-06-09 ENCOUNTER — OFFICE VISIT (OUTPATIENT)
Dept: INTERNAL MEDICINE CLINIC | Age: 45
End: 2025-06-09
Payer: COMMERCIAL

## 2025-06-09 VITALS
DIASTOLIC BLOOD PRESSURE: 70 MMHG | SYSTOLIC BLOOD PRESSURE: 122 MMHG | WEIGHT: 221.1 LBS | BODY MASS INDEX: 31.65 KG/M2 | TEMPERATURE: 98.3 F | HEART RATE: 63 BPM | HEIGHT: 70 IN

## 2025-06-09 VITALS
HEART RATE: 63 BPM | DIASTOLIC BLOOD PRESSURE: 70 MMHG | BODY MASS INDEX: 31.72 KG/M2 | WEIGHT: 221.1 LBS | SYSTOLIC BLOOD PRESSURE: 122 MMHG | TEMPERATURE: 98.3 F

## 2025-06-09 DIAGNOSIS — E80.6 UNCONJUGATED HYPERBILIRUBINEMIA: ICD-10-CM

## 2025-06-09 DIAGNOSIS — E05.00 GRAVES DISEASE: ICD-10-CM

## 2025-06-09 DIAGNOSIS — E10.29 TYPE 1 DIABETES MELLITUS WITH DIABETIC MICROALBUMINURIA (HCC): Primary | ICD-10-CM

## 2025-06-09 DIAGNOSIS — R80.9 TYPE 1 DIABETES MELLITUS WITH DIABETIC MICROALBUMINURIA (HCC): Primary | ICD-10-CM

## 2025-06-09 DIAGNOSIS — E78.00 PURE HYPERCHOLESTEROLEMIA: ICD-10-CM

## 2025-06-09 LAB — HBA1C MFR BLD: 7.8 % (ref 4.3–5.7)

## 2025-06-09 PROCEDURE — 3051F HG A1C>EQUAL 7.0%<8.0%: CPT | Performed by: INTERNAL MEDICINE

## 2025-06-09 PROCEDURE — G0108 DIAB MANAGE TRN  PER INDIV: HCPCS | Performed by: PHARMACIST

## 2025-06-09 PROCEDURE — 99214 OFFICE O/P EST MOD 30 MIN: CPT | Performed by: INTERNAL MEDICINE

## 2025-06-09 PROCEDURE — 83036 HEMOGLOBIN GLYCOSYLATED A1C: CPT | Performed by: PHARMACIST

## 2025-06-09 RX ORDER — LISINOPRIL 5 MG/1
5 TABLET ORAL DAILY
Qty: 90 TABLET | Refills: 1 | Status: SHIPPED | OUTPATIENT
Start: 2025-06-09

## 2025-06-09 RX ORDER — INSULIN ASPART 100 [IU]/ML
INJECTION, SOLUTION INTRAVENOUS; SUBCUTANEOUS
Qty: 30 ML | Refills: 5 | Status: SHIPPED | OUTPATIENT
Start: 2025-06-09

## 2025-06-09 RX ORDER — SIMVASTATIN 10 MG
10 TABLET ORAL NIGHTLY
Qty: 90 TABLET | Refills: 1 | Status: SHIPPED | OUTPATIENT
Start: 2025-06-09

## 2025-06-09 SDOH — ECONOMIC STABILITY: FOOD INSECURITY: WITHIN THE PAST 12 MONTHS, THE FOOD YOU BOUGHT JUST DIDN'T LAST AND YOU DIDN'T HAVE MONEY TO GET MORE.: PATIENT DECLINED

## 2025-06-09 SDOH — ECONOMIC STABILITY: INCOME INSECURITY: IN THE LAST 12 MONTHS, WAS THERE A TIME WHEN YOU WERE NOT ABLE TO PAY THE MORTGAGE OR RENT ON TIME?: PATIENT DECLINED

## 2025-06-09 SDOH — ECONOMIC STABILITY: FOOD INSECURITY: WITHIN THE PAST 12 MONTHS, YOU WORRIED THAT YOUR FOOD WOULD RUN OUT BEFORE YOU GOT MONEY TO BUY MORE.: PATIENT DECLINED

## 2025-06-09 SDOH — ECONOMIC STABILITY: TRANSPORTATION INSECURITY
IN THE PAST 12 MONTHS, HAS LACK OF TRANSPORTATION KEPT YOU FROM MEETINGS, WORK, OR FROM GETTING THINGS NEEDED FOR DAILY LIVING?: PATIENT DECLINED

## 2025-06-09 SDOH — ECONOMIC STABILITY: TRANSPORTATION INSECURITY
IN THE PAST 12 MONTHS, HAS THE LACK OF TRANSPORTATION KEPT YOU FROM MEDICAL APPOINTMENTS OR FROM GETTING MEDICATIONS?: PATIENT DECLINED

## 2025-06-09 ASSESSMENT — PATIENT HEALTH QUESTIONNAIRE - PHQ9
SUM OF ALL RESPONSES TO PHQ QUESTIONS 1-9: 0
1. LITTLE INTEREST OR PLEASURE IN DOING THINGS: NOT AT ALL
2. FEELING DOWN, DEPRESSED OR HOPELESS: NOT AT ALL
SUM OF ALL RESPONSES TO PHQ QUESTIONS 1-9: 0
SUM OF ALL RESPONSES TO PHQ9 QUESTIONS 1 & 2: 0
SUM OF ALL RESPONSES TO PHQ QUESTIONS 1-9: 0
2. FEELING DOWN, DEPRESSED OR HOPELESS: NOT AT ALL
SUM OF ALL RESPONSES TO PHQ QUESTIONS 1-9: 0
1. LITTLE INTEREST OR PLEASURE IN DOING THINGS: NOT AT ALL

## 2025-06-09 NOTE — PATIENT INSTRUCTIONS
Consider a carb goal of 50g/day (low carb diet)    2. Active insulin time: 2.5 hours -> 2 hours    3. Consider setting the temp target before meals when you're working    4. Consider increasing your protein intake:   -Consider protein drink (Ensure Protein/Premier Protein/Fairlife protein/Glucerna protein)/protein powder  -Consider Atkins snacks/bars  -Consider low fat cheese stick, light/Greek yogurt  -Nuts/peanut butter  -Hard boiled eggs, lunchmeat  -protein pastas  -Beans, peas, edamame    5. \"Reset\" manual basal settings:    12a-6a: 0.8   6a-12a: 0.9

## 2025-06-09 NOTE — PATIENT INSTRUCTIONS
Buy sugar free jelly and keep at work.  Get low carb bread and put 1/2 in freezer.    Make sure to have a can of nuts at work to have protein snack at work.     If putting food in mouth - think bolus. Put in missed bolus reminder on pump if possible.      Please use a pill box, set reminder on phone to take med everyday.  Set it with something you do everyday - like turning off lights at night in kitchen.      Try to work on routines for sleep.      Get labs and mammogram done.

## 2025-06-09 NOTE — PROGRESS NOTES
[facility]  [address] [place] [address]  [phone] (phone)  [phone] (fax)    Patient ID: Gege Portillo [id number]  Referring Provider: Dr. KENDALL Galeano     Patient's name and  were verified.    Subjective:    She presents for a follow-up diabetic visit. She has type 1 diabetes mellitus. She is compliant some of the time.  Assessment:     Lab Results   Component Value Date/Time    LABA1C 7.8 2025 09:38 AM    LABA1C 7.1 2023 11:19 AM    BUN 9 2024 10:44 AM    CREATININE 0.6 2024 10:44 AM     Vitals:    25 1031   BP: 122/70   Pulse: 63   Temp: 98.3 °F (36.8 °C)   Weight: 100.3 kg (221 lb 1.6 oz)     Wt Readings from Last 3 Encounters:   25 100.3 kg (221 lb 1.6 oz)   25 100.3 kg (221 lb 1.6 oz)   24 96.4 kg (212 lb 9.6 oz)     Ht Readings from Last 3 Encounters:   25 1.778 m (5' 10\")   24 1.778 m (5' 10\")   24 1.778 m (5' 10\")     Est, Glom Filt Rate   Date Value Ref Range Status   2024 > 90 >60 ml/min/1.73m2 Final       Diabetes Pharmacotherapy:  Novolog insulin per Medtronic 780G pump - Uses Automated Mode    Glucose Trends:   Glucose at 0.25 hrs PPD today resulted at 179 mg/dl  Current monitoring regimen: Guardian CGM - checks 4+ times daily  Home blood sugar trends:    -V. High: 4%, High: 22%, Target: 74%, Low: 0%, V. Low: 0%   -Average Glucose: 153mg/dL; GMI: 7.0%;  %time CGM active 83%  Any episodes of hypoglycemia? yes - 2-3x weekly, mostly while working/active    Lifestyle Factors:   Previous visit with dietician: yes - 2023  Current diet: B: egg omelet + 1 toast w/ jelly             L: cottage cheese + strawberries                       D: soup                       Snacks: cookie OR balanced break OR meat stick                       Beverages: water, occasional alcohol   Current exercise:  active while working    Health Maintenance:  Diabetes Management   Topic Date Due    Diabetic Alb to Cr ratio

## 2025-06-17 ENCOUNTER — TELEPHONE (OUTPATIENT)
Dept: INTERNAL MEDICINE CLINIC | Age: 45
End: 2025-06-17

## 2025-06-17 NOTE — TELEPHONE ENCOUNTER
Left message for patient to call office in regards to Dr. Galeano asking patient to be part of study Dr. Cottrell is conducting on Type one diabetics.

## 2025-08-11 ENCOUNTER — OFFICE VISIT (OUTPATIENT)
Age: 45
End: 2025-08-11
Payer: COMMERCIAL

## 2025-08-11 ENCOUNTER — LAB (OUTPATIENT)
Dept: LAB | Age: 45
End: 2025-08-11

## 2025-08-11 VITALS
WEIGHT: 224 LBS | OXYGEN SATURATION: 98 % | SYSTOLIC BLOOD PRESSURE: 110 MMHG | HEIGHT: 70 IN | BODY MASS INDEX: 32.07 KG/M2 | DIASTOLIC BLOOD PRESSURE: 80 MMHG | HEART RATE: 67 BPM

## 2025-08-11 DIAGNOSIS — E78.00 PURE HYPERCHOLESTEROLEMIA: ICD-10-CM

## 2025-08-11 DIAGNOSIS — R80.9 TYPE 1 DIABETES MELLITUS WITH DIABETIC MICROALBUMINURIA (HCC): ICD-10-CM

## 2025-08-11 DIAGNOSIS — E05.90 HYPERTHYROIDISM: ICD-10-CM

## 2025-08-11 DIAGNOSIS — E05.90 HYPERTHYROIDISM: Primary | ICD-10-CM

## 2025-08-11 DIAGNOSIS — E05.00 GRAVES DISEASE: ICD-10-CM

## 2025-08-11 DIAGNOSIS — E10.29 TYPE 1 DIABETES MELLITUS WITH DIABETIC MICROALBUMINURIA (HCC): ICD-10-CM

## 2025-08-11 LAB
ALBUMIN SERPL BCG-MCNC: 4.3 G/DL (ref 3.4–4.9)
ALP SERPL-CCNC: 59 U/L (ref 38–126)
ALT SERPL W/O P-5'-P-CCNC: 23 U/L (ref 10–35)
ANION GAP SERPL CALC-SCNC: 12 MEQ/L (ref 8–16)
AST SERPL-CCNC: 20 U/L (ref 10–35)
BILIRUB SERPL-MCNC: 1.6 MG/DL (ref 0.3–1.2)
BUN SERPL-MCNC: 11 MG/DL (ref 8–23)
CALCIUM SERPL-MCNC: 9 MG/DL (ref 8.5–10.5)
CHLORIDE SERPL-SCNC: 103 MEQ/L (ref 98–111)
CHOLEST SERPL-MCNC: 225 MG/DL (ref 100–199)
CO2 SERPL-SCNC: 24 MEQ/L (ref 22–29)
CREAT SERPL-MCNC: 0.8 MG/DL (ref 0.5–0.9)
CREAT UR-MCNC: 86.5 MG/DL
GFR SERPL CREATININE-BSD FRML MDRD: > 90 ML/MIN/1.73M2
GLUCOSE SERPL-MCNC: 139 MG/DL (ref 74–109)
HDLC SERPL-MCNC: 56 MG/DL
LDLC SERPL CALC-MCNC: 154 MG/DL
MICROALBUMIN UR-MCNC: 3.48 MG/DL
MICROALBUMIN/CREAT RATIO PNL UR: 40 MG/G (ref 0–30)
POTASSIUM SERPL-SCNC: 4.4 MEQ/L (ref 3.5–5.2)
PROT SERPL-MCNC: 6.9 G/DL (ref 6.4–8.3)
SODIUM SERPL-SCNC: 139 MEQ/L (ref 135–145)
T3FREE SERPL-MCNC: 2.89 PG/ML (ref 2–4.4)
T4 FREE SERPL-MCNC: 1.4 NG/DL (ref 0.92–1.68)
TRIGL SERPL-MCNC: 74 MG/DL (ref 0–199)
TSH SERPL DL<=0.05 MIU/L-ACNC: 0.82 UIU/ML (ref 0.27–4.2)

## 2025-08-11 PROCEDURE — 99214 OFFICE O/P EST MOD 30 MIN: CPT | Performed by: INTERNAL MEDICINE

## 2025-08-12 ENCOUNTER — TELEPHONE (OUTPATIENT)
Dept: INTERNAL MEDICINE CLINIC | Age: 45
End: 2025-08-12

## 2025-08-13 LAB — TSI SER-ACNC: 0.25 IU/L
